# Patient Record
Sex: FEMALE | Race: WHITE | Employment: OTHER | URBAN - METROPOLITAN AREA
[De-identification: names, ages, dates, MRNs, and addresses within clinical notes are randomized per-mention and may not be internally consistent; named-entity substitution may affect disease eponyms.]

---

## 2022-01-13 ENCOUNTER — APPOINTMENT (OUTPATIENT)
Dept: CT IMAGING | Age: 82
DRG: 374 | End: 2022-01-13
Payer: MEDICARE

## 2022-01-13 ENCOUNTER — HOSPITAL ENCOUNTER (INPATIENT)
Age: 82
LOS: 8 days | Discharge: HOSPICE | DRG: 374 | End: 2022-01-21
Attending: EMERGENCY MEDICINE | Admitting: HOSPITALIST
Payer: MEDICARE

## 2022-01-13 ENCOUNTER — APPOINTMENT (OUTPATIENT)
Dept: GENERAL RADIOLOGY | Age: 82
DRG: 374 | End: 2022-01-13
Payer: MEDICARE

## 2022-01-13 DIAGNOSIS — I26.99 ACUTE PULMONARY EMBOLISM WITHOUT ACUTE COR PULMONALE, UNSPECIFIED PULMONARY EMBOLISM TYPE (HCC): Primary | ICD-10-CM

## 2022-01-13 DIAGNOSIS — J90 PLEURAL EFFUSION, BILATERAL: ICD-10-CM

## 2022-01-13 DIAGNOSIS — K74.60 CIRRHOSIS OF LIVER WITH ASCITES, UNSPECIFIED HEPATIC CIRRHOSIS TYPE (HCC): ICD-10-CM

## 2022-01-13 DIAGNOSIS — R18.8 CIRRHOSIS OF LIVER WITH ASCITES, UNSPECIFIED HEPATIC CIRRHOSIS TYPE (HCC): ICD-10-CM

## 2022-01-13 LAB
A/G RATIO: 1 (ref 1.1–2.2)
ALBUMIN SERPL-MCNC: 2.7 G/DL (ref 3.4–5)
ALP BLD-CCNC: 116 U/L (ref 40–129)
ALT SERPL-CCNC: 9 U/L (ref 10–40)
ANION GAP SERPL CALCULATED.3IONS-SCNC: 16 MMOL/L (ref 3–16)
AST SERPL-CCNC: 18 U/L (ref 15–37)
BASOPHILS ABSOLUTE: 0 K/UL (ref 0–0.2)
BASOPHILS RELATIVE PERCENT: 0.4 %
BILIRUB SERPL-MCNC: 0.5 MG/DL (ref 0–1)
BUN BLDV-MCNC: 17 MG/DL (ref 7–20)
CALCIUM SERPL-MCNC: 8.3 MG/DL (ref 8.3–10.6)
CHLORIDE BLD-SCNC: 98 MMOL/L (ref 99–110)
CO2: 20 MMOL/L (ref 21–32)
CREAT SERPL-MCNC: 0.7 MG/DL (ref 0.6–1.2)
D DIMER: >5250 NG/ML DDU (ref 0–229)
EOSINOPHILS ABSOLUTE: 0.1 K/UL (ref 0–0.6)
EOSINOPHILS RELATIVE PERCENT: 0.9 %
GFR AFRICAN AMERICAN: >60
GFR NON-AFRICAN AMERICAN: >60
GLUCOSE BLD-MCNC: 150 MG/DL (ref 70–99)
HCT VFR BLD CALC: 37.5 % (ref 36–48)
HEMOGLOBIN: 12.5 G/DL (ref 12–16)
LYMPHOCYTES ABSOLUTE: 0.5 K/UL (ref 1–5.1)
LYMPHOCYTES RELATIVE PERCENT: 6.4 %
MAGNESIUM: 1.9 MG/DL (ref 1.8–2.4)
MCH RBC QN AUTO: 29.8 PG (ref 26–34)
MCHC RBC AUTO-ENTMCNC: 33.4 G/DL (ref 31–36)
MCV RBC AUTO: 89.2 FL (ref 80–100)
MONOCYTES ABSOLUTE: 0.5 K/UL (ref 0–1.3)
MONOCYTES RELATIVE PERCENT: 7.2 %
NEUTROPHILS ABSOLUTE: 6.2 K/UL (ref 1.7–7.7)
NEUTROPHILS RELATIVE PERCENT: 85.1 %
PDW BLD-RTO: 15.1 % (ref 12.4–15.4)
PLATELET # BLD: 241 K/UL (ref 135–450)
PMV BLD AUTO: 7.8 FL (ref 5–10.5)
POTASSIUM REFLEX MAGNESIUM: 3.4 MMOL/L (ref 3.5–5.1)
PRO-BNP: 360 PG/ML (ref 0–449)
RBC # BLD: 4.2 M/UL (ref 4–5.2)
SODIUM BLD-SCNC: 134 MMOL/L (ref 136–145)
TOTAL PROTEIN: 5.3 G/DL (ref 6.4–8.2)
WBC # BLD: 7.3 K/UL (ref 4–11)

## 2022-01-13 PROCEDURE — 99284 EMERGENCY DEPT VISIT MOD MDM: CPT

## 2022-01-13 PROCEDURE — 83880 ASSAY OF NATRIURETIC PEPTIDE: CPT

## 2022-01-13 PROCEDURE — 2060000000 HC ICU INTERMEDIATE R&B

## 2022-01-13 PROCEDURE — 80053 COMPREHEN METABOLIC PANEL: CPT

## 2022-01-13 PROCEDURE — 83735 ASSAY OF MAGNESIUM: CPT

## 2022-01-13 PROCEDURE — 6360000004 HC RX CONTRAST MEDICATION: Performed by: EMERGENCY MEDICINE

## 2022-01-13 PROCEDURE — 6370000000 HC RX 637 (ALT 250 FOR IP): Performed by: HOSPITALIST

## 2022-01-13 PROCEDURE — 71045 X-RAY EXAM CHEST 1 VIEW: CPT

## 2022-01-13 PROCEDURE — 6370000000 HC RX 637 (ALT 250 FOR IP): Performed by: EMERGENCY MEDICINE

## 2022-01-13 PROCEDURE — 71260 CT THORAX DX C+: CPT

## 2022-01-13 PROCEDURE — 86706 HEP B SURFACE ANTIBODY: CPT

## 2022-01-13 PROCEDURE — 85025 COMPLETE CBC W/AUTO DIFF WBC: CPT

## 2022-01-13 PROCEDURE — 85379 FIBRIN DEGRADATION QUANT: CPT

## 2022-01-13 PROCEDURE — 36415 COLL VENOUS BLD VENIPUNCTURE: CPT

## 2022-01-13 RX ORDER — METFORMIN HYDROCHLORIDE 500 MG/1
TABLET, EXTENDED RELEASE ORAL
COMMUNITY
Start: 2021-11-09

## 2022-01-13 RX ORDER — POTASSIUM CHLORIDE 20 MEQ/1
40 TABLET, EXTENDED RELEASE ORAL ONCE
Status: COMPLETED | OUTPATIENT
Start: 2022-01-13 | End: 2022-01-13

## 2022-01-13 RX ORDER — DILTIAZEM HYDROCHLORIDE 120 MG/1
CAPSULE, COATED, EXTENDED RELEASE ORAL
Status: ON HOLD | COMMUNITY
Start: 2021-11-09 | End: 2022-01-21 | Stop reason: HOSPADM

## 2022-01-13 RX ORDER — IRBESARTAN AND HYDROCHLOROTHIAZIDE 300; 12.5 MG/1; MG/1
1 TABLET, FILM COATED ORAL DAILY
Status: ON HOLD | COMMUNITY
Start: 2021-11-08 | End: 2022-01-21 | Stop reason: HOSPADM

## 2022-01-13 RX ADMIN — IOPAMIDOL 75 ML: 755 INJECTION, SOLUTION INTRAVENOUS at 19:59

## 2022-01-13 RX ADMIN — APIXABAN 10 MG: 5 TABLET, FILM COATED ORAL at 20:47

## 2022-01-13 RX ADMIN — POTASSIUM CHLORIDE 40 MEQ: 20 TABLET, EXTENDED RELEASE ORAL at 22:37

## 2022-01-14 ENCOUNTER — APPOINTMENT (OUTPATIENT)
Dept: CT IMAGING | Age: 82
DRG: 374 | End: 2022-01-14
Payer: MEDICARE

## 2022-01-14 LAB
A/G RATIO: 1.2 (ref 1.1–2.2)
ALBUMIN SERPL-MCNC: 2.5 G/DL (ref 3.4–5)
ALP BLD-CCNC: 101 U/L (ref 40–129)
ALT SERPL-CCNC: 8 U/L (ref 10–40)
ANION GAP SERPL CALCULATED.3IONS-SCNC: 13 MMOL/L (ref 3–16)
AST SERPL-CCNC: 15 U/L (ref 15–37)
BASOPHILS ABSOLUTE: 0 K/UL (ref 0–0.2)
BASOPHILS RELATIVE PERCENT: 0.6 %
BILIRUB SERPL-MCNC: 0.4 MG/DL (ref 0–1)
BUN BLDV-MCNC: 17 MG/DL (ref 7–20)
CALCIUM SERPL-MCNC: 8 MG/DL (ref 8.3–10.6)
CHLORIDE BLD-SCNC: 98 MMOL/L (ref 99–110)
CHOLESTEROL, TOTAL: 146 MG/DL (ref 0–199)
CO2: 23 MMOL/L (ref 21–32)
CREAT SERPL-MCNC: 0.8 MG/DL (ref 0.6–1.2)
EOSINOPHILS ABSOLUTE: 0.1 K/UL (ref 0–0.6)
EOSINOPHILS RELATIVE PERCENT: 1.8 %
FERRITIN: 601.5 NG/ML (ref 15–150)
GFR AFRICAN AMERICAN: >60
GFR NON-AFRICAN AMERICAN: >60
GLUCOSE BLD-MCNC: 146 MG/DL (ref 70–99)
GLUCOSE BLD-MCNC: 174 MG/DL (ref 70–99)
GLUCOSE BLD-MCNC: 177 MG/DL (ref 70–99)
GLUCOSE BLD-MCNC: 181 MG/DL (ref 70–99)
HAV IGM SER IA-ACNC: NORMAL
HBV SURFACE AB TITR SER: <3.5 MIU/ML
HCT VFR BLD CALC: 33 % (ref 36–48)
HCT VFR BLD CALC: 33.1 % (ref 36–48)
HCT VFR BLD CALC: 35.7 % (ref 36–48)
HDLC SERPL-MCNC: 34 MG/DL (ref 40–60)
HEMOGLOBIN: 10.9 G/DL (ref 12–16)
HEMOGLOBIN: 11.2 G/DL (ref 12–16)
HEMOGLOBIN: 11.9 G/DL (ref 12–16)
HEPATITIS B CORE IGM ANTIBODY: NORMAL
HEPATITIS B SURFACE ANTIGEN INTERPRETATION: NORMAL
HEPATITIS C ANTIBODY INTERPRETATION: NORMAL
INR BLD: 1.51 (ref 0.88–1.12)
IRON SATURATION: 15 % (ref 15–50)
IRON: 29 UG/DL (ref 37–145)
LDL CHOLESTEROL CALCULATED: 85 MG/DL
LYMPHOCYTES ABSOLUTE: 0.6 K/UL (ref 1–5.1)
LYMPHOCYTES RELATIVE PERCENT: 10.4 %
MAGNESIUM: 1.7 MG/DL (ref 1.8–2.4)
MCH RBC QN AUTO: 29.8 PG (ref 26–34)
MCHC RBC AUTO-ENTMCNC: 34 G/DL (ref 31–36)
MCV RBC AUTO: 87.8 FL (ref 80–100)
MONOCYTES ABSOLUTE: 0.6 K/UL (ref 0–1.3)
MONOCYTES RELATIVE PERCENT: 9.5 %
NEUTROPHILS ABSOLUTE: 4.5 K/UL (ref 1.7–7.7)
NEUTROPHILS RELATIVE PERCENT: 77.7 %
PDW BLD-RTO: 15.1 % (ref 12.4–15.4)
PERFORMED ON: ABNORMAL
PLATELET # BLD: 228 K/UL (ref 135–450)
PMV BLD AUTO: 7.7 FL (ref 5–10.5)
POTASSIUM REFLEX MAGNESIUM: 3.4 MMOL/L (ref 3.5–5.1)
PROTHROMBIN TIME: 17.3 SEC (ref 9.9–12.7)
RBC # BLD: 3.77 M/UL (ref 4–5.2)
SODIUM BLD-SCNC: 134 MMOL/L (ref 136–145)
TOTAL IRON BINDING CAPACITY: 196 UG/DL (ref 260–445)
TOTAL PROTEIN: 4.6 G/DL (ref 6.4–8.2)
TRIGL SERPL-MCNC: 135 MG/DL (ref 0–150)
TSH REFLEX: 2.56 UIU/ML (ref 0.27–4.2)
VITAMIN D 25-HYDROXY: 22.5 NG/ML
VLDLC SERPL CALC-MCNC: 27 MG/DL
WBC # BLD: 5.9 K/UL (ref 4–11)

## 2022-01-14 PROCEDURE — 6360000002 HC RX W HCPCS: Performed by: FAMILY MEDICINE

## 2022-01-14 PROCEDURE — 2580000003 HC RX 258: Performed by: HOSPITALIST

## 2022-01-14 PROCEDURE — 83550 IRON BINDING TEST: CPT

## 2022-01-14 PROCEDURE — 83735 ASSAY OF MAGNESIUM: CPT

## 2022-01-14 PROCEDURE — 82105 ALPHA-FETOPROTEIN SERUM: CPT

## 2022-01-14 PROCEDURE — 82390 ASSAY OF CERULOPLASMIN: CPT

## 2022-01-14 PROCEDURE — 82728 ASSAY OF FERRITIN: CPT

## 2022-01-14 PROCEDURE — 85018 HEMOGLOBIN: CPT

## 2022-01-14 PROCEDURE — 6370000000 HC RX 637 (ALT 250 FOR IP): Performed by: HOSPITALIST

## 2022-01-14 PROCEDURE — 82306 VITAMIN D 25 HYDROXY: CPT

## 2022-01-14 PROCEDURE — 6360000002 HC RX W HCPCS: Performed by: INTERNAL MEDICINE

## 2022-01-14 PROCEDURE — 6360000004 HC RX CONTRAST MEDICATION: Performed by: PHYSICIAN ASSISTANT

## 2022-01-14 PROCEDURE — 86708 HEPATITIS A ANTIBODY: CPT

## 2022-01-14 PROCEDURE — 94760 N-INVAS EAR/PLS OXIMETRY 1: CPT

## 2022-01-14 PROCEDURE — 6370000000 HC RX 637 (ALT 250 FOR IP): Performed by: INTERNAL MEDICINE

## 2022-01-14 PROCEDURE — 84443 ASSAY THYROID STIM HORMONE: CPT

## 2022-01-14 PROCEDURE — 85014 HEMATOCRIT: CPT

## 2022-01-14 PROCEDURE — 93970 EXTREMITY STUDY: CPT

## 2022-01-14 PROCEDURE — 6360000004 HC RX CONTRAST MEDICATION: Performed by: NURSE PRACTITIONER

## 2022-01-14 PROCEDURE — 85025 COMPLETE CBC W/AUTO DIFF WBC: CPT

## 2022-01-14 PROCEDURE — 80053 COMPREHEN METABOLIC PANEL: CPT

## 2022-01-14 PROCEDURE — 83516 IMMUNOASSAY NONANTIBODY: CPT

## 2022-01-14 PROCEDURE — 82104 ALPHA-1-ANTITRYPSIN PHENO: CPT

## 2022-01-14 PROCEDURE — 6360000004 HC RX CONTRAST MEDICATION

## 2022-01-14 PROCEDURE — 80074 ACUTE HEPATITIS PANEL: CPT

## 2022-01-14 PROCEDURE — 86038 ANTINUCLEAR ANTIBODIES: CPT

## 2022-01-14 PROCEDURE — 36415 COLL VENOUS BLD VENIPUNCTURE: CPT

## 2022-01-14 PROCEDURE — 83540 ASSAY OF IRON: CPT

## 2022-01-14 PROCEDURE — 82103 ALPHA-1-ANTITRYPSIN TOTAL: CPT

## 2022-01-14 PROCEDURE — 74177 CT ABD & PELVIS W/CONTRAST: CPT

## 2022-01-14 PROCEDURE — 86039 ANTINUCLEAR ANTIBODIES (ANA): CPT

## 2022-01-14 PROCEDURE — 80061 LIPID PANEL: CPT

## 2022-01-14 PROCEDURE — 2060000000 HC ICU INTERMEDIATE R&B

## 2022-01-14 PROCEDURE — 85610 PROTHROMBIN TIME: CPT

## 2022-01-14 RX ORDER — SODIUM CHLORIDE 0.9 % (FLUSH) 0.9 %
5-40 SYRINGE (ML) INJECTION EVERY 12 HOURS SCHEDULED
Status: DISCONTINUED | OUTPATIENT
Start: 2022-01-14 | End: 2022-01-21 | Stop reason: HOSPADM

## 2022-01-14 RX ORDER — SODIUM CHLORIDE 0.9 % (FLUSH) 0.9 %
5-40 SYRINGE (ML) INJECTION PRN
Status: DISCONTINUED | OUTPATIENT
Start: 2022-01-14 | End: 2022-01-21 | Stop reason: HOSPADM

## 2022-01-14 RX ORDER — ATORVASTATIN CALCIUM 40 MG/1
40 TABLET, FILM COATED ORAL NIGHTLY
Status: DISCONTINUED | OUTPATIENT
Start: 2022-01-14 | End: 2022-01-14

## 2022-01-14 RX ORDER — MAGNESIUM SULFATE 1 G/100ML
1000 INJECTION INTRAVENOUS ONCE
Status: COMPLETED | OUTPATIENT
Start: 2022-01-14 | End: 2022-01-14

## 2022-01-14 RX ORDER — SODIUM CHLORIDE 9 MG/ML
25 INJECTION, SOLUTION INTRAVENOUS PRN
Status: DISCONTINUED | OUTPATIENT
Start: 2022-01-14 | End: 2022-01-14 | Stop reason: SDUPTHER

## 2022-01-14 RX ORDER — ACETAMINOPHEN 650 MG/1
650 SUPPOSITORY RECTAL EVERY 6 HOURS PRN
Status: DISCONTINUED | OUTPATIENT
Start: 2022-01-14 | End: 2022-01-21 | Stop reason: HOSPADM

## 2022-01-14 RX ORDER — ONDANSETRON 4 MG/1
4 TABLET, ORALLY DISINTEGRATING ORAL EVERY 8 HOURS PRN
Status: DISCONTINUED | OUTPATIENT
Start: 2022-01-14 | End: 2022-01-21 | Stop reason: HOSPADM

## 2022-01-14 RX ORDER — ONDANSETRON 2 MG/ML
4 INJECTION INTRAMUSCULAR; INTRAVENOUS EVERY 6 HOURS PRN
Status: DISCONTINUED | OUTPATIENT
Start: 2022-01-14 | End: 2022-01-14 | Stop reason: SDUPTHER

## 2022-01-14 RX ORDER — POTASSIUM BICARBONATE 25 MEQ/1
50 TABLET, EFFERVESCENT ORAL ONCE
Status: COMPLETED | OUTPATIENT
Start: 2022-01-14 | End: 2022-01-14

## 2022-01-14 RX ORDER — SODIUM CHLORIDE 0.9 % (FLUSH) 0.9 %
5-40 SYRINGE (ML) INJECTION PRN
Status: DISCONTINUED | OUTPATIENT
Start: 2022-01-14 | End: 2022-01-14 | Stop reason: SDUPTHER

## 2022-01-14 RX ORDER — HYDROCHLOROTHIAZIDE 25 MG/1
12.5 TABLET ORAL DAILY
Status: DISCONTINUED | OUTPATIENT
Start: 2022-01-14 | End: 2022-01-19

## 2022-01-14 RX ORDER — ONDANSETRON 4 MG/1
4 TABLET, ORALLY DISINTEGRATING ORAL EVERY 8 HOURS PRN
Status: DISCONTINUED | OUTPATIENT
Start: 2022-01-14 | End: 2022-01-14 | Stop reason: SDUPTHER

## 2022-01-14 RX ORDER — ONDANSETRON 2 MG/ML
4 INJECTION INTRAMUSCULAR; INTRAVENOUS EVERY 6 HOURS PRN
Status: DISCONTINUED | OUTPATIENT
Start: 2022-01-14 | End: 2022-01-21 | Stop reason: HOSPADM

## 2022-01-14 RX ORDER — LOSARTAN POTASSIUM 100 MG/1
100 TABLET ORAL DAILY
Status: DISCONTINUED | OUTPATIENT
Start: 2022-01-14 | End: 2022-01-21 | Stop reason: HOSPADM

## 2022-01-14 RX ORDER — ACETAMINOPHEN 325 MG/1
650 TABLET ORAL EVERY 6 HOURS PRN
Status: DISCONTINUED | OUTPATIENT
Start: 2022-01-14 | End: 2022-01-21 | Stop reason: HOSPADM

## 2022-01-14 RX ORDER — IRBESARTAN AND HYDROCHLOROTHIAZIDE 300; 12.5 MG/1; MG/1
1 TABLET, FILM COATED ORAL DAILY
Status: DISCONTINUED | OUTPATIENT
Start: 2022-01-14 | End: 2022-01-14 | Stop reason: CLARIF

## 2022-01-14 RX ORDER — MAGNESIUM HYDROXIDE/ALUMINUM HYDROXICE/SIMETHICONE 120; 1200; 1200 MG/30ML; MG/30ML; MG/30ML
30 SUSPENSION ORAL EVERY 6 HOURS PRN
Status: DISCONTINUED | OUTPATIENT
Start: 2022-01-14 | End: 2022-01-21 | Stop reason: HOSPADM

## 2022-01-14 RX ORDER — DILTIAZEM HYDROCHLORIDE 60 MG/1
120 CAPSULE, EXTENDED RELEASE ORAL DAILY
Status: DISCONTINUED | OUTPATIENT
Start: 2022-01-14 | End: 2022-01-21 | Stop reason: HOSPADM

## 2022-01-14 RX ORDER — PANTOPRAZOLE SODIUM 40 MG/1
40 TABLET, DELAYED RELEASE ORAL
Status: DISCONTINUED | OUTPATIENT
Start: 2022-01-15 | End: 2022-01-21 | Stop reason: HOSPADM

## 2022-01-14 RX ORDER — SODIUM CHLORIDE 0.9 % (FLUSH) 0.9 %
5-40 SYRINGE (ML) INJECTION EVERY 12 HOURS SCHEDULED
Status: DISCONTINUED | OUTPATIENT
Start: 2022-01-14 | End: 2022-01-14 | Stop reason: SDUPTHER

## 2022-01-14 RX ORDER — SODIUM CHLORIDE 9 MG/ML
25 INJECTION, SOLUTION INTRAVENOUS PRN
Status: DISCONTINUED | OUTPATIENT
Start: 2022-01-14 | End: 2022-01-21 | Stop reason: HOSPADM

## 2022-01-14 RX ORDER — ATORVASTATIN CALCIUM 40 MG/1
40 TABLET, FILM COATED ORAL DAILY
Status: DISCONTINUED | OUTPATIENT
Start: 2022-01-14 | End: 2022-01-21 | Stop reason: HOSPADM

## 2022-01-14 RX ORDER — POLYETHYLENE GLYCOL 3350 17 G/17G
17 POWDER, FOR SOLUTION ORAL DAILY PRN
Status: DISCONTINUED | OUTPATIENT
Start: 2022-01-14 | End: 2022-01-21 | Stop reason: HOSPADM

## 2022-01-14 RX ADMIN — Medication 10 ML: at 10:16

## 2022-01-14 RX ADMIN — IOPAMIDOL 75 ML: 755 INJECTION, SOLUTION INTRAVENOUS at 13:22

## 2022-01-14 RX ADMIN — DILTIAZEM HYDROCHLORIDE 120 MG: 60 CAPSULE, EXTENDED RELEASE ORAL at 10:28

## 2022-01-14 RX ADMIN — IOHEXOL 50 ML: 240 INJECTION, SOLUTION INTRATHECAL; INTRAVASCULAR; INTRAVENOUS; ORAL at 10:30

## 2022-01-14 RX ADMIN — APIXABAN 10 MG: 5 TABLET, FILM COATED ORAL at 10:08

## 2022-01-14 RX ADMIN — IOHEXOL 50 ML: 240 INJECTION, SOLUTION INTRATHECAL; INTRAVASCULAR; INTRAVENOUS; ORAL at 10:31

## 2022-01-14 RX ADMIN — MAGNESIUM SULFATE HEPTAHYDRATE 1000 MG: 1 INJECTION, SOLUTION INTRAVENOUS at 10:47

## 2022-01-14 RX ADMIN — LOSARTAN POTASSIUM 100 MG: 100 TABLET, FILM COATED ORAL at 10:08

## 2022-01-14 RX ADMIN — HYDROCHLOROTHIAZIDE 12.5 MG: 25 TABLET ORAL at 10:07

## 2022-01-14 RX ADMIN — ENOXAPARIN SODIUM 80 MG: 100 INJECTION SUBCUTANEOUS at 20:00

## 2022-01-14 RX ADMIN — Medication 10 ML: at 20:00

## 2022-01-14 RX ADMIN — ATORVASTATIN CALCIUM 40 MG: 40 TABLET, FILM COATED ORAL at 10:28

## 2022-01-14 RX ADMIN — POTASSIUM BICARBONATE 50 MEQ: 978 TABLET, EFFERVESCENT ORAL at 10:27

## 2022-01-14 ASSESSMENT — PAIN SCALES - GENERAL
PAINLEVEL_OUTOF10: 0

## 2022-01-14 NOTE — PROGRESS NOTES
I spoke with Carver Homans, RN, patient had a sudden onset of nausea after her scan and then had an episode of emesis which appears orange/red in color, this will be sent to check for microscopic blood. I will check a stat H&H and check H&H more frequently every 6 hours, will change Eliquis to Lovenox in case of any need for any procedures. RN did reach out to GI physician who recommended to continue present management unless patient develops overt bleeding, message was sent to attending physician to evaluate tomorrow morning.

## 2022-01-14 NOTE — ED PROVIDER NOTES
[address] PROVIDER NOTE    Patient Identification  Pt Name: Seda Tomas  MRN: [id number]  Christigfhellen 1940  Date of evaluation: 1/13/2022  Provider: Katy Esteves MD  PCP: Philomena Amor MD    Chief Complaint  Leg Swelling (Pt reports \"fluid retention\" with leg swelling ) and Fatigue      HPI  (History provided by patient)  This is a 80 y.o. female who was brought in by self for worsening lower extremity edema over the last 2 weeks. She is also developed increasing shortness of breath, particularly on exertion. She feels very tired and has little energy. She has not had fever or chills. She denies chest pain, hemoptysis, productive cough, and other symptoms. Previous history significant for multiple cardiac risk factors as well as DVT and PE. Symptoms have become so significant that she came to the emergency department for further evaluation. ROS  10 systems reviewed, pertinent positives/negatives per HPI otherwise noted to be negative. I have reviewed the following nursing documentation:  Allergies: Amoxicillin-pot clavulanate    Past medical history:   Past Medical History:   Diagnosis Date    Cancer ([address]     skin     Diabetes mellitus ([address]     DVT, lower extremity ([address]     Heart murmur     Hyperlipidemia     Hypertension     Pulmonary embolism ([address]      Past surgical history:   Past Surgical History:   Procedure Laterality Date    APPENDECTOMY      COLONOSCOPY      CYSTOSCOPY  11/21/13    HYSTERECTOMY      OTHER SURGICAL HISTORY  11/21/13    Transvaginal taping and sling    PARTIAL NEPHRECTOMY Right 07/12/2016    robotic - [facility] medications:   Previous Medications    ASPIRIN 81 MG TABLET    Take 81 mg by mouth daily    ATORVASTATIN (LIPITOR) 40 MG TABLET    Take 40 mg by mouth daily    CALCIUM CARBONATE (OSCAL) 500 MG TABS TABLET    Take 500 mg by mouth daily.     DILTIAZEM (CARDIZEM CD) 120 MG EXTENDED RELEASE CAPSULE        DILTIAZEM (CARDIZEM SR) 120 MG SR CAPSULE    Take 120 mg by mouth daily    IRBESARTAN-HYDROCHLOROTHIAZIDE (AVALIDE) 300-12.5 MG PER TABLET    Take 1 tablet by mouth daily    METFORMIN (GLUCOPHAGE) 500 MG TABLET    Take 500 mg by mouth 2 times daily (with meals)    METFORMIN (GLUCOPHAGE-XR) 500 MG EXTENDED RELEASE TABLET        POTASSIUM GLUCONATE 595 MG CAPS    Take  by mouth daily. SENNOSIDES-DOCUSATE SODIUM (SENOKOT-S) 8.6-50 MG TABLET    Take 1 tablet by mouth 2 times daily    VALSARTAN-HYDROCHLOROTHIAZIDE (DIOVAN HCT) 160-12.5 MG PER TABLET    Take 1 tablet by mouth daily. Social history:  reports that she has never smoked. She has never used smokeless tobacco. She reports that she does not drink alcohol and does not use drugs. Family history:    Family History   Problem Relation Age of Onset    Cancer Mother         stomach    Anesth Problems Neg Hx     Malig Hyperten Neg Hx     Hypotension Neg Hx     Malig Hypertherm Neg Hx     Pseudochol. Deficiency Neg Hx        Exam  ED Triage Vitals [01/13/22 1643]   BP Temp Temp src Pulse Resp SpO2 Height Weight   (!) 158/84 97.5 °F (36.4 °C) -- 83 18 97 % -- 198 lb (89.8 kg)     Nursing note and vitals reviewed. Constitutional: Ill-appearing, but nontoxic. HENT:      Head: Normocephalic and atraumatic. Ears: External ears normal.      Nose: Nose normal.     Mouth: Membrane mucosa moist and pink. Eyes: Anicteric sclera. No discharge. Neck: Supple. Trachea midline. Cardiovascular: RRR; no murmurs, rubs, or gallops. Pulmonary/Chest: Effort normal. No respiratory distress. Diminished breath sounds in the bilateral bases. Otherwise clear to auscultation. No wheezes no rales. Abdominal: Soft. No distension. Nontender to palpation  Musculoskeletal: Moves all extremities. No gross deformity. Pitting edema to bilateral lower extremities. Neurological: Alert and oriented. Face symmetric.  Speech is clear.  Skin: Warm and dry. No rash. Psychiatric: Normal mood and affect. Behavior is normal.    Radiology  CT CHEST PULMONARY EMBOLISM W CONTRAST   Preliminary Result   Acute pulmonary emboli. No CT evidence of right heart strain. Moderate left and small right pleural effusions. Large amount of ascites. Probable cirrhosis. XR CHEST PORTABLE   Final Result   Small right pleural effusion with adjacent atelectasis. Mild pulmonary edema.              Labs  Results for orders placed or performed during the hospital encounter of 01/13/22   CBC Auto Differential   Result Value Ref Range    WBC 7.3 4.0 - 11.0 K/uL    RBC 4.20 4.00 - 5.20 M/uL    Hemoglobin 12.5 12.0 - 16.0 g/dL    Hematocrit 37.5 36.0 - 48.0 %    MCV 89.2 80.0 - 100.0 fL    MCH 29.8 26.0 - 34.0 pg    MCHC 33.4 31.0 - 36.0 g/dL    RDW 15.1 12.4 - 15.4 %    Platelets 963 403 - 855 K/uL    MPV 7.8 5.0 - 10.5 fL    Neutrophils % 85.1 %    Lymphocytes % 6.4 %    Monocytes % 7.2 %    Eosinophils % 0.9 %    Basophils % 0.4 %    Neutrophils Absolute 6.2 1.7 - 7.7 K/uL    Lymphocytes Absolute 0.5 (L) 1.0 - 5.1 K/uL    Monocytes Absolute 0.5 0.0 - 1.3 K/uL    Eosinophils Absolute 0.1 0.0 - 0.6 K/uL    Basophils Absolute 0.0 0.0 - 0.2 K/uL   Comprehensive Metabolic Panel w/ Reflex to MG   Result Value Ref Range    Sodium 134 (L) 136 - 145 mmol/L    Potassium reflex Magnesium 3.4 (L) 3.5 - 5.1 mmol/L    Chloride 98 (L) 99 - 110 mmol/L    CO2 20 (L) 21 - 32 mmol/L    Anion Gap 16 3 - 16    Glucose 150 (H) 70 - 99 mg/dL    BUN 17 7 - 20 mg/dL    CREATININE 0.7 0.6 - 1.2 mg/dL    GFR Non-African American >60 >60    GFR African American >60 >60    Calcium 8.3 8.3 - 10.6 mg/dL    Total Protein 5.3 (L) 6.4 - 8.2 g/dL    Albumin 2.7 (L) 3.4 - 5.0 g/dL    Albumin/Globulin Ratio 1.0 (L) 1.1 - 2.2    Total Bilirubin 0.5 0.0 - 1.0 mg/dL    Alkaline Phosphatase 116 40 - 129 U/L    ALT 9 (L) 10 - 40 U/L    AST 18 15 - 37 U/L   Brain Natriuretic Peptide Result Value Ref Range    Pro- 0 - 449 pg/mL   D-dimer, quantitative   Result Value Ref Range    D-Dimer, Quant >5250 (H) 0 - 229 ng/mL DDU   Magnesium   Result Value Ref Range    Magnesium 1.90 1.80 - 2.40 mg/dL       MDM and ED Course  Patient presented with severe lower extremity edema worsening over the last few weeks. She was also complaining of worsening dyspnea on exertion. She had previous history of PE/DVT, but is not currently on anticoagulation. D-dimer was markedly elevated. CT chest shows acute emboli in the right lung. When she first arrived and I suspected she at least at DVTs, I had initiated anticoagulation with Eliquis. She also has pleural effusions, ascites, and cirrhosis with no previous known history of liver disease. She denies previous history of hepatitis and denies previous or current alcoholism. Son at bedside was equally surprised by the liver findings. I consulted Dr. Collette Dais through [facility] for admission. He reviewed the patient's history, physical exam, labs, imaging studies, and emergency department course and has decided to admit Cori Elizabeth for further evaluation and treatment. As I have deemed necessary from their history, physical, and studies, I have considered and evaluated Cori Elizabeth for the following diagnoses:  ACUTE CORONARY SYNDROME, SEVERE CHRONIC OBSTRUCTIVE PULMONARY DISEASE, RESPIRATORY FAILURE/ARREST, ACUTE CONGESTIVE HEART FAILURE, CARDIAC TAMPONADE, PNEUMONIA, PNEUMOTHORAX, PERICARDITIS, PULMONARY EMBOLISM, AORTIC DISSECTION, ARDS    The total Critical Care time is 30 minutes which excludes separately billable procedures. Final Impression  1. Acute pulmonary embolism without acute cor pulmonale, unspecified pulmonary embolism type ([address]    2. Cirrhosis of liver with ascites, unspecified hepatic cirrhosis type ([address]    3. Pleural effusion, bilateral        Blood pressure (!) 152/83, pulse 79, temperature 97.5 °F (36.4 °C), resp.  rate 19, weight 198 lb (89.8 kg), SpO2 98 %. Disposition:  Admit    This chart was generated using the [address] [place] [address] dictation system. I created this record but it may contain dictation errors given the limitations of this technology.        Jed Connor MD  01/24/22 8954

## 2022-01-14 NOTE — H&P
_    [facility] HOSPITALIST HISTORY AND PHYSICAL/CONSULT    1/13/2022 11:17 PM    Patient Information:  Rashad Wharton is a 80 y.o. female [id number]    PCP:  Britni Donnelly MD (Tel: [phone] )    Date of Service:   Pt seen/examined on 1/13/22   Admitted to Inpatient with expected LOS greater than two midnights due to medical therapy. Chief complaint:    Chief Complaint   Patient presents with    Leg Swelling     Pt reports \"fluid retention\" with leg swelling     Fatigue       History of Present Illness:  Tab Hunt is a 80 y.o. female who presented with   · Comes from Home   · Reports LE edema B/L   · Reports inc Abd distension   · Reports SOB   · All above X few weeks now   · H/O DVT Noted in past and she had been on xarelto in past for same   · Denies any chest pain   ·        History and pertinent information obtained from   · ED provider   · Prior Chart    · Patient         Notable/Significant ED Findings/VItals :   · HTN   · Is on RA   · VSS        While in ED  · Patient care Given. · Appropriate Monitoring done. · Pertinent Labs done. See [facility] for details   · Pertinent Acute issues identified and managed . See Epic for details  · Pertinent consults called and case d/w them by ED Provider . See Epic for details. · Imaging  CXR ,  CT,  done in ED . While in ED, Indicated/Pertinent Medications/Care given as below      · ED Chart reviewed. · Medications reviewed w/c were give in ED . See [facility] for details on medications and orders  · Eliquis PO X 1   · PO K X 1   ·       · Imaging/Labs/Work up done in ED reviewed and their interpretation/active and acute issues, as mentioned below in Assessment and Plan. Currently, on my evaluation, patient's condition as below :   · Since arrival, post above Rx, patient is AO X 3   · No signs of s/o acute Life Threatening distress or acute hemodynamic instability, noted @ time of my evaluation of patient.        10 complete review of symptoms performed. Pertinent positives and negatives listed above in HPI. REVIEW OF SYSTEMS:      Pertinent positives and negatives are noted in the HPI . All other systems were reviewed and are negative. Past Medical History:         has a past medical history of Cancer ([facility] Diabetes mellitus ([facility] DVT, lower extremity ([facility] Heart murmur, Hyperlipidemia, Hypertension, and Pulmonary embolism ([facility] Past Surgical History:         has a past surgical history that includes Skin cancer excision; Hysterectomy; Colonoscopy; other surgical history (11/21/13); Cystocopy (11/21/13); Appendectomy; and partial nephrectomy (Right, 07/12/2016). Medications:      Current Outpatient Medications on File Prior to Encounter   Medication Sig Dispense Refill    irbesartan-hydroCHLOROthiazide (AVALIDE) 300-12.5 MG per tablet Take 1 tablet by mouth daily      diltiazem (CARDIZEM SR) 120 MG SR capsule Take 120 mg by mouth daily      metFORMIN (GLUCOPHAGE) 500 MG tablet Take 500 mg by mouth 2 times daily (with meals)      atorvastatin (LIPITOR) 40 MG tablet Take 40 mg by mouth daily      dilTIAZem (CARDIZEM CD) 120 MG extended release capsule       metFORMIN (GLUCOPHAGE-XR) 500 MG extended release tablet       rivaroxaban (XARELTO) 15 MG TABS tablet Take 1 tablet by mouth 2 times daily (with meals) 42 tablet 0    sennosides-docusate sodium (SENOKOT-S) 8.6-50 MG tablet Take 1 tablet by mouth 2 times daily 50 tablet 0    aspirin 81 MG tablet Take 81 mg by mouth daily      Potassium Gluconate 595 MG CAPS Take  by mouth daily.  calcium carbonate (OSCAL) 500 MG TABS tablet Take 500 mg by mouth daily.  valsartan-hydrochlorothiazide (DIOVAN HCT) 160-12.5 MG per tablet Take 1 tablet by mouth daily. Allergies: Allergies   Allergen Reactions    Amoxicillin-Pot Clavulanate Hives          Social History:   reports that she has never smoked.  She has never used smokeless tobacco. She reports that she does not drink alcohol and does not use drugs. Family History:            Problem Relation Age of Onset    Cancer Mother         stomach    Anesth Problems Neg Hx     Malig Hyperten Neg Hx     Hypotension Neg Hx     Malig Hypertherm Neg Hx     Pseudochol. Deficiency Neg Hx            Physical Exam:  BP (!) 149/77   Pulse 68   Temp 97.5 °F (36.4 °C)   Resp 20   Wt 198 lb (89.8 kg)   SpO2 98%   BMI 35.07 kg/m²     General appearance:  Ill appearing . Eyes:  Sclera clear, pupils equal  ENT:  Dry   mucus membranes, Trachea midline. Cardiovascular: Regular rhythm, normal S1, S2.  LE edema in lower extremities   Respiratory:  Noted Dec AE B/ L . Gastrointestinal:  Abdomen soft,  non-tender,  not distended,  normal bowel sounds   Musculoskeletal:  No cyanosis in digits, neck supple  Neurology:  Cranial nerves grossly intact. Alert and oriented in time, place and person. No speech or motor deficits   Psychiatry:  Appropriate affect. Not agitated  Skin:  Warm, dry, normal turgor, no rash       Labs:     Recent Labs     01/13/22  1718   WBC 7.3   HGB 12.5   HCT 37.5        Recent Labs     01/13/22  1718   *   K 3.4*   CL 98*   CO2 20*   BUN 17   CREATININE 0.7   CALCIUM 8.3     Recent Labs     01/13/22  1718   AST 18   ALT 9*   BILITOT 0.5   ALKPHOS 116     No results for input(s): INR in the last 72 hours. No results for input(s): Chyrel Lions in the last 72 hours. Urinalysis:    No results found for: [place], [place], [facility], [address], [address] [facility]      Radiology:     CXR:   I have reviewed the CXR  Mild Pulm edema       IMAGING :     CT CHEST PULMONARY EMBOLISM W CONTRAST   Preliminary Result   Acute pulmonary emboli. No CT evidence of right heart strain. Moderate left and small right pleural effusions. Large amount of ascites. Probable cirrhosis.          XR CHEST PORTABLE   Final Result   Small right pleural effusion with adjacent atelectasis. Mild pulmonary edema. PROBLEM LIST [ ACTIVE + CHRONIC ]     Active Hospital Problems    Diagnosis Date Noted    Ascites [R18.8] 01/13/2022    Pulmonary hypertension (HCC) [I27.20] 07/16/2016    Acute hypoxemic respiratory failure (HCC) [J96.01]     DVT (deep vein thrombosis)  [O22.30] 07/15/2016    Hypoxia [R09.02] 07/15/2016           ACUTE & CHRONIC MEDICAL ISSUES, POA/PRESENT ON ADMISSION      · Acute PE , POA   · Mild PULM edema POA   · LE edema B/L POA   · Ascites POA   · H/o DVT in past . Provoked 2/2 Sx per Hx         PERTINENT PLAN/ORDERS FOR ACTIVE MEDICAL ISSUES     · Medication received in ED and workup in ED so far Noted and reviewed as above. · Home medications for chronic medical problems Reviewed and Held/Resumed/Changes made, as clinically warranted/Indicated. · Started PO Eliquis PO BID in ED . Will c/w same for now   · GI c.s in AM for ascites and Cirrhosis W/u   · ? Needs paracentesis planned and evaluated further . Deferred to GI Recs   · Labs for Cirrhosis w/u to be sent per orders . · LE US VD planned to r/o DVT   · ? Consider HEM c/s in AM ?? . Recurrent DVT/PE . Also her son has DVT/PEs and he is only 45s . ? Needs r/o Familial d/o for Clotting ? ?            · Please See EPIC Order for detailed plans of care and further Details. · DVT Prophylaxis . Eliquis PO   ; + SCDs   · Nutrition/Diet. No diet orders on file  · Code Status . Prior  · PT/OT and ambulatory Eval Status. Ambulate with Assist    · Probable LOS & future Disposition planned post discharge . Home in 1-2 days       CONSULTS ORDERED @ ADMISSION   IP CONSULT TO GI      Medical Decision Making : HIGH     Total patient  Care [ Direct and Indirect ] time spent in evaluating the patient an discussing plan with appropriate staff/patient/family members is 54 min       Collin Peralta MD    [facility].    1/13/2022 11:17 PM

## 2022-01-14 NOTE — PROGRESS NOTES
Physical/Occupational Therapy  Yong S Mariola    Orders recieved for PT/OT evaluation. Chart reviewed. Noted to have developed an acute PE while on anticoagulation. Dicussed with nursing. Approval obtained for therapy in pm as patient currently prepping for testing. Will hold therapy at this time and will follow up with pt in pm as schedule permits.  Thanks, Sakshi Leonard, PT, DPT [id number], Yen Logan, MOT OTR/L [id number]

## 2022-01-14 NOTE — CONSULTS
Gastroenterology Consult Note        Patient: Christen Polo  : 1940  Acct#:      Date:  2022    Subjective:       History of Present Illness  Patient is a 80 y.o.  female admitted with Ascites [R18.8]  Pleural effusion, bilateral [J90]  Cirrhosis of liver with ascites, unspecified hepatic cirrhosis type ([address] [K74.60, R18.8]  Acute pulmonary embolism without acute cor pulmonale, unspecified pulmonary embolism type ([address] [I26.99] who is seen in consult for ascites. Patient with history of diabetes hypertension hyperlipidemia. Prior blood clot in the past but no longer on anticoagulation. History of renal cell cancer status post resection followed by Dr. Kelley Webster. Patient came to the ER for 3-week history of swelling in the BLE. Also had some shortness of breath more recently. She was diagnosed with acute PE.  CT concerning for cirrhotic liver and ascites. No prior diagnosis of liver disease. No history of alcohol abuse. Patient does report some abdominal bloating but no abdominal pain. Has had some postprandial nausea and vomiting intermittently over the past month. Patient states if she eats a few bites too many then she will vomit. No hematemesis, melena, hematochezia. She reports 35 pound intentional weight loss in the past.  Per report, her son and grandchild have history of a clotting disorder.       Past Medical History:   Diagnosis Date    Cancer ([address]     skin     Diabetes mellitus ([address]     DVT, lower extremity ([address]     Heart murmur     Hyperlipidemia     Hypertension     Pulmonary embolism ([address]       Past Surgical History:   Procedure Laterality Date    APPENDECTOMY      COLONOSCOPY      CYSTOSCOPY  13    HYSTERECTOMY      OTHER SURGICAL HISTORY  13    Transvaginal taping and sling    PARTIAL NEPHRECTOMY Right 2016    robotic - Dr. Venus Calixto        Past Endoscopic History: Colonoscopy maybe 5 years ago per patient. Admission Meds  No current facility-administered medications on file prior to encounter. Current Outpatient Medications on File Prior to Encounter   Medication Sig Dispense Refill    irbesartan-hydroCHLOROthiazide (AVALIDE) 300-12.5 MG per tablet Take 1 tablet by mouth daily      diltiazem (CARDIZEM SR) 120 MG SR capsule Take 120 mg by mouth daily      metFORMIN (GLUCOPHAGE) 500 MG tablet Take 500 mg by mouth 2 times daily (with meals)      atorvastatin (LIPITOR) 40 MG tablet Take 40 mg by mouth daily      aspirin 81 MG tablet Take 81 mg by mouth daily      dilTIAZem (CARDIZEM CD) 120 MG extended release capsule       metFORMIN (GLUCOPHAGE-XR) 500 MG extended release tablet       rivaroxaban (XARELTO) 15 MG TABS tablet Take 1 tablet by mouth 2 times daily (with meals) 42 tablet 0    sennosides-docusate sodium (SENOKOT-S) 8.6-50 MG tablet Take 1 tablet by mouth 2 times daily 50 tablet 0    Potassium Gluconate 595 MG CAPS Take  by mouth daily.  calcium carbonate (OSCAL) 500 MG TABS tablet Take 500 mg by mouth daily.  valsartan-hydrochlorothiazide (DIOVAN HCT) 160-12.5 MG per tablet Take 1 tablet by mouth daily. Patient denies NSAID use other than baby aspirin daily. Allergies  Allergies   Allergen Reactions    Amoxicillin-Pot Clavulanate Hives      Social   Social History     Tobacco Use    Smoking status: Never Smoker    Smokeless tobacco: Never Used   Substance Use Topics    Alcohol use: No        Family History   Problem Relation Age of Onset    Cancer Mother         stomach    Anesth Problems Neg Hx     Malig Hyperten Neg Hx     Hypotension Neg Hx     Malig Hypertherm Neg Hx     Pseudochol.  Deficiency Neg Hx        Review of Systems  Constitutional: negative for fevers, chills, sweats    Ears, nose, mouth, throat, and face: negative for nasal congestion and sore throat   Respiratory: negative for cough + shortness of breath   Cardiovascular: negative for chest pain and dyspnea   Gastrointestinal: see hpi   Genitourinary:negative for dysuria and frequency   Integument/breast: negative for pruritus and rash   Hematologic/lymphatic: negative for bleeding and easy bruising + swelling in BLE  Musculoskeletal:negative for arthralgias and myalgias   Neurological: negative for dizziness and weakness         Physical Exam  Blood pressure 111/64, pulse 72, temperature 98.5 °F (36.9 °C), temperature source Oral, resp. rate 20, height 5' 2\" (1.575 m), weight 176 lb (79.8 kg), SpO2 94 %. General appearance: alert, cooperative, no distress, appears stated age  Eyes: Anicteric  Head: Normocephalic, without obvious abnormality  Lungs: clear to auscultation bilaterally, Normal Effort  Heart: regular rate and rhythm, normal S1 and S2, no murmurs or rubs  Abdomen: soft, non-tender. Bowel sounds normal. No masses,  no organomegaly. Extremities: atraumatic, no cyanosis. BLE edema  Skin: warm and dry, no jaundice  Neuro: Grossly intact, A&OX3  Musculoskeletal: 5/5  strength BUE      Data Review:    Recent Labs     01/13/22  1718 01/14/22  0450   WBC 7.3 5.9   HGB 12.5 11.2*   HCT 37.5 33.1*   MCV 89.2 87.8    228     Recent Labs     01/13/22  1718 01/14/22  0450   * 134*   K 3.4* 3.4*   CL 98* 98*   CO2 20* 23   BUN 17 17   CREATININE 0.7 0.8     Recent Labs     01/13/22  1718 01/14/22  0450   AST 18 15   ALT 9* 8*   BILITOT 0.5 0.4   ALKPHOS 116 101     No results for input(s): LIPASE, AMYLASE in the last 72 hours. No results for input(s): PROTIME, INR in the last 72 hours. No results for input(s): PTT in the last 72 hours. No results for input(s): OCCULTBLD in the last 72 hours.     Imaging Studies:                               CT chest pe protocol with contrast 1/13/22  Impression   Acute pulmonary emboli.       No CT evidence of right heart strain.       Moderate left and small right pleural effusions.       Large amount of ascites.     Probable cirrhosis.                        Assessment:     Active Problems:    Hypoxia    DVT (deep vein thrombosis)     Pulmonary hypertension (HCC)    Acute hypoxemic respiratory failure (HCC)    Ascites  Resolved Problems:    * No resolved hospital problems. *    Cirrhosis -new diagnosis per CT as above. Liver enzymes are normal.  Normal platelets. INR pending. No history of alcohol abuse. She has longstanding diabetes so could have Lemon cirrhosis. No GI bleeding. No encephalopathy. Ascites -noted on CT. Abdomen is soft. PE -hematology following. On Eliquis. Nausea and vomiting -intermittent over the past month. Takes a baby aspirin but no other NSAIDs.       This is a very pleasant 82-year-old female who was seen at the bedside today with her certified physicians assistant  This patient came in because she has been having problems with a nausea vomiting and shortness of breath  She was found to have a CT scan showing pulmonary embolism  When she had her CT scan done she was also found to have a significant amount of ascites and possible nodular liver  So we were asked to see and evaluate her for this medical problem    In seeing her today she also had 2 people in her room at her bedside and I did answer questions  She does have significant amount of lower extremity swelling and by physical exam her abdomen is soft she does not have an overt amount of ascites on physical    At this time the question is is does this patient have cirrhosis  That is a hard question to answer there is the possibility that the patient states she has never had a problem with her liver so she may have had  A nonalcoholic fatty liver that now has transformed into a cirrhotic liver    Looking at her abdominal pelvic CT she does have a fair amount of ascites  It would be nice if we could arrange for the patient to have a paracentesis  Would then allow us to figure out what her serum to ascites albumin gradient is  And this would then tell us if this is an ascites from portal hypertension/cirrhosis or if this is an ascites from another issue    With her being on anticoagulation I do not know if our radiology division would do a paracentesis  That we will have to investigate    thank you for this kind referral    Kaushik Dagmar   Recommendations:   -PPI  -CT abdomen pelvis with IV and oral contrast to eval the liver, for the nausea and vomiting, and to rule out any obvious malignancy as source of her PE. -Check ferritin, CLAUDIA, F-actin, AMA, ceruloplasmin, alpha-1 antitrypsin, hepatitis panel  -Check AFP   -check hepatitis a and B vaccination status  -Check INR  -At some point will need EGD for her history of nausea and vomiting as well as for variceal screening  -Hold off on paracentesis at this point as she does not have any abdominal pain and is on Eliquis.     Discussed with Dr. Aminta Gil, [address] [place] [address]

## 2022-01-14 NOTE — CONSULTS
[facility]   CONSULT NOTE    1/14/2022 9:44 AM    Patient Information: Jose Willard   Date of Admit:  1/13/2022  Primary Care Physician:  Enrique Kamara MD  Requesting Physician:  Torey Mendosa MD    Reason for consult:   Evaluation and recommendations for recurrent VTE    Chief complaint:    Chief Complaint   Patient presents with    Leg Swelling     Pt reports \"fluid retention\" with leg swelling     Fatigue       History of Present Illness:  Jose Willard is a 80 y.o. female on Torey Mendosa MD service who was admitted on 1/13/2022 for fatigue, shortness of breath and and leg swelling. D-dimer was > 5250 on admission. CT pulmonary angiogram shows acute emboli within the lobar and segmental arteries of the right lung, no evidence of right heart strain. Cirrhotic liver and large amount of ascites was also noted. She denies history of liver disease. She has a history of DVT after hip surgery in 2008. She also had PE and DVT in 2016 after partial nephrectomy for renal cell carcinoma in 2016. She was treated with Xarelto. She did not require adjuvant therapy for her cancer. She denies recent surgery, travel and injury. She denies sedentary lifestyle. She reports that her son and her granddaughter have a blood clotting disorder but does not know what mutation. Past Medical History:     has a past medical history of Cancer ([address] Diabetes mellitus ([address] DVT, lower extremity ([address] Heart murmur, Hyperlipidemia, Hypertension, and Pulmonary embolism ([address]      Past Surgical History:    Past Surgical History:   Procedure Laterality Date    APPENDECTOMY      COLONOSCOPY      CYSTOSCOPY  11/21/13    HYSTERECTOMY      OTHER SURGICAL HISTORY  11/21/13    Transvaginal taping and sling    PARTIAL NEPHRECTOMY Right 07/12/2016    robotic - Dr. Jodee Blanco          Current Medications:     dilTIAZem  120 mg Oral Daily    sodium chloride flush  5-40 mL IntraVENous 2 times per day    apixaban  10 mg Oral BID    Followed by   Charly Prado ON 1/20/2022] apixaban  5 mg Oral BID    hydroCHLOROthiazide  12.5 mg Oral Daily    And    losartan  100 mg Oral Daily    atorvastatin  40 mg Oral Daily    magnesium sulfate  1,000 mg IntraVENous Once    potassium bicarbonate  50 mEq Oral Once       Allergies: Allergies   Allergen Reactions    Amoxicillin-Pot Clavulanate Hives        Social History:    reports that she has never smoked. She has never used smokeless tobacco. She reports that she does not drink alcohol and does not use drugs. Family History:     family history includes Cancer in her mother. ROS:      Constitutional:  No weight loss, No fever, No chills, No night sweats.     Eyes:  No impairment or change in vision  ENT / Mouth:  No pain, abnormal ulceration, bleeding, nasal drip or change in voice or hearing  Cardiovascular:  No chest pain, palpitations, new edema, or calf discomfort  Respiratory:  No pain, hemoptysis, change to breathing  Gastrointestinal:  No pain, cramping, jaundice, change to eating and bowel habits  Urinary:  No pain, bleeding or change in continence  Musculoskeletal:  No redness, pain, edema or weakness  Skin:  No pruritus, rash, change to nodules or lesions  Neurologic:  No discomfort, change in mental status, speech, sensory or motor activity  Psychiatric:  No change in concentration or change to affect or mood  Endocrine:  No hot flashes, increased thirst, or change to urine production  Hematologic: No petechiae, ecchymosis or bleeding  Lymphatic:  No lymphadenopathy or lymphedema  Allergy / Immunologic:  No eczema, hives, frequent or recurrent infections    PHYSICAL EXAM:    Vitals:  Vitals:    01/14/22 0730   BP: 111/64   Pulse: 72   Resp: 20   Temp: 98.5 °F (36.9 °C)   SpO2: 94%      No intake or output data in the 24 hours ending 01/14/22 0944   Wt Readings from Last 3 Encounters:   01/14/22 176 lb (79.8 kg)   07/12/16 195 lb 12.3 oz (88.8 kg)   06/29/16 205 lb (93 kg)        General appearance: Appears comfortable. Eyes: Sclera clear, pupils equal  ENT: Moist mucus membranes, no thrush  Neck: Trachea midline, symmetrical  Cardiovascular: Regular rhythm, normal S1, S2. No murmur, gallop, rub. No edema in  lower extremities  Respiratory: Clear to auscultation bilaterally. No wheeze. Good inspiratory effort  Gastrointestinal: Abdomen soft, not tender, not distended, normal bowel sounds  Musculoskeletal: No cyanosis in digits, warm extremities. + LE edema. Neurologic: Cranial nerves grossly intact, no motor or speech deficits. Psychiatric: Normal affect. Alert and oriented to time, place and person.   Skin: Warm, dry, normal turgor, no rash    DATA:  CBC:   Lab Results   Component Value Date    WBC 5.9 01/14/2022    RBC 3.77 01/14/2022    HGB 11.2 01/14/2022    HCT 33.1 01/14/2022    MCV 87.8 01/14/2022    MCH 29.8 01/14/2022    MCHC 34.0 01/14/2022    RDW 15.1 01/14/2022     01/14/2022    MPV 7.7 01/14/2022     BMP:  Lab Results   Component Value Date     01/14/2022    K 3.4 01/14/2022    CL 98 01/14/2022    CO2 23 01/14/2022    BUN 17 01/14/2022    CREATININE 0.8 01/14/2022    CALCIUM 8.0 01/14/2022    GFRAA >60 01/14/2022    GFRAA >60 03/08/2012    LABGLOM >60 01/14/2022    GLUCOSE 174 01/14/2022     Magnesium:   Lab Results   Component Value Date    MG 1.70 01/14/2022    MG 1.90 01/13/2022     LIVER PROFILE:   Recent Labs     01/13/22  1718 01/14/22  0450   AST 18 15   ALT 9* 8*   BILITOT 0.5 0.4   ALKPHOS 116 101     PT/INR:    Lab Results   Component Value Date    PROTIME 11.0 06/29/2016    INR 0.97 06/29/2016     IMAGING:    Narrative   EXAMINATION:   ONE XRAY VIEW OF THE CHEST       1/13/2022 5:22 pm       COMPARISON:   Chest x-ray dated 07/15/2016       HISTORY:   ORDERING SYSTEM PROVIDED HISTORY: shortness of breath   TECHNOLOGIST PROVIDED HISTORY:   Reason for exam:->shortness of breath   Reason for Exam: Leg Swelling (Pt reports \"fluid retention\" with leg swelling   & being fatigue)       FINDINGS:   Stable appearance of right upper lobe nodule. Small right pleural effusion   with adjacent atelectasis. Mild pulmonary edema. Cardiomegaly. Impression   Small right pleural effusion with adjacent atelectasis. Mild pulmonary edema. Narrative   EXAMINATION:   CTA OF THE CHEST 1/13/2022 8:04 pm       TECHNIQUE:   CTA of the chest was performed after the administration of intravenous   contrast.  Multiplanar reformatted images are provided for review. MIP   images are provided for review. Dose modulation, iterative reconstruction,   and/or weight based adjustment of the mA/kV was utilized to reduce the   radiation dose to as low as reasonably achievable. COMPARISON:   07/15/2016       HISTORY:   ORDERING SYSTEM PROVIDED HISTORY: leg swelling and shortness of breath,   D-dimer > 5250   TECHNOLOGIST PROVIDED HISTORY:   Reason for exam:->leg swelling and shortness of breath, D-dimer > 5250   Decision Support Exception - unselect if not a suspected or confirmed   emergency medical condition->Emergency Medical Condition (MA)   Reason for Exam: leg swelling and shortness of breath, D-dimer > 5250. Fatigue. Leg Swelling (Pt reports \"fluid retention\" with leg swelling ). FINDINGS:   Pulmonary Arteries: There are acute emboli within the lobar and segmental   arteries of the right lung. There is no CT evidence of right heart strain. The main pulmonary artery is chronically enlarged to 40 mm in diameter   suggesting pulmonary hypertension. Mediastinum: No evidence of mediastinal lymphadenopathy. The heart and   pericardium demonstrate no acute abnormality. There is no acute abnormality   of the thoracic aorta. Lungs/pleura: There are moderate left and small right pleural effusions. There is associated passive atelectasis in the lower lobes. There is no   acute consolidation.        Upper Abdomen: There is a large amount of ascites. The liver is cirrhotic. Soft Tissues/Bones: No acute osseous abnormality. Impression   Acute pulmonary emboli. No CT evidence of right heart strain. Moderate left and small right pleural effusions. Large amount of ascites. Probable cirrhosis. IMPRESSION/RECOMMENDATIONS:    Active Problems:    Hypoxia    DVT (deep vein thrombosis)     Pulmonary hypertension (HCC)    Acute hypoxemic respiratory failure (HCC)    Ascites  Resolved Problems:    * No resolved hospital problems. *       75 year old female with a history of DMII, hyperlipidemia and hypertension. She has:    1. Acute pulmonary embolism  -History of provoked PE and DVT  -Current PE is unprovoked. -Dopplers pending.   -Will obtain CT a/p to rule out malignancy.  -Continue Eliquis 10 mg BID. -Will hold off with hypercoagulable workup as this would not change the current plan. -If she finds out what hypercoagulable disorder her son has we will check that. 2. Liver cirrhosis with ascites  -New diagnosis, likely GILMAN. -GI is following. 3. Anemia  -Iron studies, B12 and folate are pending    4. History of renal cell carcinoma  -S/p partial nephrectomy in 2016. This plan was discussed with the patient and he/she verbalized understanding. Thank you for allowing us to participate in the care of this patient. Marizol Vazquez, [facility]  [facility] [address] [place] [address].  [phone]    Patient was seen this evening. Admitted for shortness of breath. Found to have PE.  CT scan showed cirrhosis and ascites. Doppler revealed bilateral acute DVT in her lower extremities. Both legs are swollen. She has history of DVT after surgery in 2008 and that she was treated with Xarelto for about a year. She has history of partial nephrectomy for renal cell carcinoma in 2016. She denies alcohol use. She has lost some weight.   She has family history of thrombosis and her

## 2022-01-14 NOTE — PROGRESS NOTES
Assessment complete. VSS no SOB or any distress noted. Lungs sounds clear and diminished. Pt oriented to room and floor policies. Plan of care discussed and agreed upon. Pt considered scotty fall risk. Pt is in sinus rhythm at this time. Call light within reach, pt encouraged to call if any needs arise. No further requests at this time. Will continue to monitor.

## 2022-01-14 NOTE — CONSULTS
Pharmacy to check patient copays for Eliquis/Xarelto:    Copay for patient will be: $527/month. Remberto Bravo will continue to follow the decision for anticoagulation and  the patient if appropriate.        Atha Denver PharmD, BCPS

## 2022-01-14 NOTE — PROGRESS NOTES
Hospitalist Progress Note      PCP: Katerin Joe MD    Date of Admission: 1/13/2022    Chief Complaint: Leg swelling and fatigue    Hospital Course: 72-year-old female presents with with bilateral lower extremity edema, abdominal distention, and shortness of breath lasting for several weeks. There is history of DVT. Subjective: Patient seen and examined bedside. Medications:  Reviewed    Infusion Medications    sodium chloride       Scheduled Medications    dilTIAZem  120 mg Oral Daily    sodium chloride flush  5-40 mL IntraVENous 2 times per day    apixaban  10 mg Oral BID    Followed by   Radha Franc ON 1/20/2022] apixaban  5 mg Oral BID    hydroCHLOROthiazide  12.5 mg Oral Daily    And    losartan  100 mg Oral Daily    atorvastatin  40 mg Oral Daily    [START ON 1/15/2022] pantoprazole  40 mg Oral QAM AC     PRN Meds: sodium chloride flush, sodium chloride, ondansetron **OR** ondansetron, polyethylene glycol, acetaminophen **OR** acetaminophen, aluminum & magnesium hydroxide-simethicone    No intake or output data in the 24 hours ending 01/14/22 1427    Physical Exam Performed:    /64   Pulse 72   Temp 98.5 °F (36.9 °C) (Oral)   Resp 20   Ht 5' 2\" (1.575 m)   Wt 176 lb (79.8 kg)   SpO2 94%   BMI 32.19 kg/m²     General appearance: No apparent distress, appears stated age and cooperative. HEENT: Pupils equal, round, and reactive to light. Conjunctivae/corneas clear. Neck: Supple, with full range of motion. No jugular venous distention. Trachea midline. Respiratory:  Normal respiratory effort. Clear to auscultation, bilaterally without Rales/Wheezes/Rhonchi. Cardiovascular: Regular rate and rhythm with normal S1/S2 without murmurs, rubs or gallops. Abdomen: Soft, non-tender, non-distended with normal bowel sounds. Musculoskeletal: No clubbing, cyanosis or edema bilaterally. Full range of motion without deformity.   Skin: Skin color, texture, turgor normal.  No rashes or lesions. Neurologic:  Neurovascularly intact without any focal sensory/motor deficits. Cranial nerves: II-XII intact, grossly non-focal.  Psychiatric: Alert and oriented, thought content appropriate, normal insight  Capillary Refill: Brisk,< 3 seconds   Peripheral Pulses: +2 palpable, equal bilaterally       Labs:   Recent Labs     01/13/22  1718 01/14/22  0450   WBC 7.3 5.9   HGB 12.5 11.2*   HCT 37.5 33.1*    228     Recent Labs     01/13/22  1718 01/14/22  0450   * 134*   K 3.4* 3.4*   CL 98* 98*   CO2 20* 23   BUN 17 17   CREATININE 0.7 0.8   CALCIUM 8.3 8.0*     Recent Labs     01/13/22  1718 01/14/22  0450   AST 18 15   ALT 9* 8*   BILITOT 0.5 0.4   ALKPHOS 116 101     Recent Labs     01/14/22  1046   INR 1.51*     No results for input(s): Charles Cage in the last 72 hours. Urinalysis:    No results found for: Tha Henle, BACTERIA, RBCUA, BLOODU, SPECGRAV, Any [address]    Radiology:  CT ABDOMEN PELVIS W IV CONTRAST Additional Contrast? Oral   Final Result   Volume overload including large volume ascites and anasarca. Cirrhotic liver. No hepatic mass is visualized. Mild right hydronephrosis with distal ureteral tapering concerning for   ureteral stricture. RECOMMENDATIONS:   Unavailable         CT CHEST PULMONARY EMBOLISM W CONTRAST   Preliminary Result   Acute pulmonary emboli. No CT evidence of right heart strain. Moderate left and small right pleural effusions. Large amount of ascites. Probable cirrhosis. XR CHEST PORTABLE   Final Result   Small right pleural effusion with adjacent atelectasis. Mild pulmonary edema.          VL Extremity Venous Bilateral    (Results Pending)           Assessment/Plan:    Active Hospital Problems    Diagnosis     Ascites [R18.8]     Pulmonary hypertension ([address] [I27.20]     Acute hypoxemic respiratory failure (HCC) [J96.01]     DVT (deep vein thrombosis)  [O22.30]     Hypoxia [R09.02]      Acute pulmonary emboli  Has history of DVT  Likely related to liver disease  Started on Eliquis upon admission    Ascites  Liver cirrhosis on CT  GI consulted  Work-up in progress    Lower extremity edema  Likely related to liver disease and ascites  Bilateral venous Dopplers were ordered to rule out DVT    DVT Prophylaxis: Eliquis  Diet: ADULT DIET;  Regular; Low Fat/Low Chol/High Fiber/2 gm Na  Code Status: Full Code    Electronically signed by Samantha Turcios MD on 1/14/2022 at 2:27 PM

## 2022-01-15 LAB
ANION GAP SERPL CALCULATED.3IONS-SCNC: 8 MMOL/L (ref 3–16)
ANTI-NUCLEAR ANTIBODY (ANA): POSITIVE
BASOPHILS ABSOLUTE: 0 K/UL (ref 0–0.2)
BASOPHILS RELATIVE PERCENT: 0.7 %
BUN BLDV-MCNC: 15 MG/DL (ref 7–20)
CALCIUM SERPL-MCNC: 7.8 MG/DL (ref 8.3–10.6)
CHLORIDE BLD-SCNC: 101 MMOL/L (ref 99–110)
CO2: 26 MMOL/L (ref 21–32)
CREAT SERPL-MCNC: 0.8 MG/DL (ref 0.6–1.2)
EOSINOPHILS ABSOLUTE: 0.2 K/UL (ref 0–0.6)
EOSINOPHILS RELATIVE PERCENT: 3.3 %
GFR AFRICAN AMERICAN: >60
GFR NON-AFRICAN AMERICAN: >60
GLUCOSE BLD-MCNC: 138 MG/DL (ref 70–99)
GLUCOSE BLD-MCNC: 140 MG/DL (ref 70–99)
GLUCOSE BLD-MCNC: 155 MG/DL (ref 70–99)
GLUCOSE BLD-MCNC: 211 MG/DL (ref 70–99)
HCT VFR BLD CALC: 33.2 % (ref 36–48)
HEMOGLOBIN: 11.2 G/DL (ref 12–16)
LV EF: 68 %
LVEF MODALITY: NORMAL
LYMPHOCYTES ABSOLUTE: 0.6 K/UL (ref 1–5.1)
LYMPHOCYTES RELATIVE PERCENT: 10.4 %
MAGNESIUM: 1.8 MG/DL (ref 1.8–2.4)
MCH RBC QN AUTO: 29.6 PG (ref 26–34)
MCHC RBC AUTO-ENTMCNC: 33.6 G/DL (ref 31–36)
MCV RBC AUTO: 88.1 FL (ref 80–100)
MONOCYTES ABSOLUTE: 0.5 K/UL (ref 0–1.3)
MONOCYTES RELATIVE PERCENT: 8.5 %
NEUTROPHILS ABSOLUTE: 4.7 K/UL (ref 1.7–7.7)
NEUTROPHILS RELATIVE PERCENT: 77.1 %
PDW BLD-RTO: 15.3 % (ref 12.4–15.4)
PERFORMED ON: ABNORMAL
PHOSPHORUS: 3 MG/DL (ref 2.5–4.9)
PLATELET # BLD: 251 K/UL (ref 135–450)
PMV BLD AUTO: 7.1 FL (ref 5–10.5)
POTASSIUM SERPL-SCNC: 4.1 MMOL/L (ref 3.5–5.1)
RBC # BLD: 3.77 M/UL (ref 4–5.2)
SODIUM BLD-SCNC: 135 MMOL/L (ref 136–145)
WBC # BLD: 6.1 K/UL (ref 4–11)

## 2022-01-15 PROCEDURE — 97166 OT EVAL MOD COMPLEX 45 MIN: CPT

## 2022-01-15 PROCEDURE — 80048 BASIC METABOLIC PNL TOTAL CA: CPT

## 2022-01-15 PROCEDURE — 36415 COLL VENOUS BLD VENIPUNCTURE: CPT

## 2022-01-15 PROCEDURE — 97116 GAIT TRAINING THERAPY: CPT

## 2022-01-15 PROCEDURE — 2060000000 HC ICU INTERMEDIATE R&B

## 2022-01-15 PROCEDURE — 97162 PT EVAL MOD COMPLEX 30 MIN: CPT

## 2022-01-15 PROCEDURE — 97530 THERAPEUTIC ACTIVITIES: CPT

## 2022-01-15 PROCEDURE — 2580000003 HC RX 258: Performed by: HOSPITALIST

## 2022-01-15 PROCEDURE — 93306 TTE W/DOPPLER COMPLETE: CPT

## 2022-01-15 PROCEDURE — 6360000002 HC RX W HCPCS: Performed by: FAMILY MEDICINE

## 2022-01-15 PROCEDURE — 97535 SELF CARE MNGMENT TRAINING: CPT

## 2022-01-15 PROCEDURE — 6370000000 HC RX 637 (ALT 250 FOR IP): Performed by: PHYSICIAN ASSISTANT

## 2022-01-15 PROCEDURE — 84100 ASSAY OF PHOSPHORUS: CPT

## 2022-01-15 PROCEDURE — 85025 COMPLETE CBC W/AUTO DIFF WBC: CPT

## 2022-01-15 PROCEDURE — 6370000000 HC RX 637 (ALT 250 FOR IP): Performed by: HOSPITALIST

## 2022-01-15 PROCEDURE — 83735 ASSAY OF MAGNESIUM: CPT

## 2022-01-15 RX ADMIN — HYDROCHLOROTHIAZIDE 12.5 MG: 25 TABLET ORAL at 09:15

## 2022-01-15 RX ADMIN — Medication 10 ML: at 09:48

## 2022-01-15 RX ADMIN — Medication 10 ML: at 20:55

## 2022-01-15 RX ADMIN — LOSARTAN POTASSIUM 100 MG: 100 TABLET, FILM COATED ORAL at 09:16

## 2022-01-15 RX ADMIN — ATORVASTATIN CALCIUM 40 MG: 40 TABLET, FILM COATED ORAL at 09:16

## 2022-01-15 RX ADMIN — ENOXAPARIN SODIUM 80 MG: 100 INJECTION SUBCUTANEOUS at 20:54

## 2022-01-15 RX ADMIN — PANTOPRAZOLE SODIUM 40 MG: 40 TABLET, DELAYED RELEASE ORAL at 09:16

## 2022-01-15 RX ADMIN — ENOXAPARIN SODIUM 80 MG: 100 INJECTION SUBCUTANEOUS at 09:18

## 2022-01-15 RX ADMIN — DILTIAZEM HYDROCHLORIDE 120 MG: 60 CAPSULE, EXTENDED RELEASE ORAL at 09:16

## 2022-01-15 ASSESSMENT — PAIN SCALES - GENERAL
PAINLEVEL_OUTOF10: 0

## 2022-01-15 NOTE — PROGRESS NOTES
Hospitalist Progress Note      PCP: Eliseo Villareal MD    Date of Admission: 1/13/2022    Chief Complaint: Leg swelling and fatigue    Hospital Course: 79-year-old female presents with with bilateral lower extremity edema, abdominal distention, and shortness of breath lasting for several weeks. There is history of DVT. Subjective: Patient seen and examined bedside. Medications:  Reviewed    Infusion Medications    sodium chloride       Scheduled Medications    dilTIAZem  120 mg Oral Daily    sodium chloride flush  5-40 mL IntraVENous 2 times per day    hydroCHLOROthiazide  12.5 mg Oral Daily    And    losartan  100 mg Oral Daily    atorvastatin  40 mg Oral Daily    pantoprazole  40 mg Oral QAM AC    enoxaparin  1 mg/kg SubCUTAneous BID     PRN Meds: sodium chloride flush, sodium chloride, ondansetron **OR** ondansetron, polyethylene glycol, acetaminophen **OR** acetaminophen, aluminum & magnesium hydroxide-simethicone, perflutren lipid microspheres      Intake/Output Summary (Last 24 hours) at 1/15/2022 1553  Last data filed at 1/15/2022 0806  Gross per 24 hour   Intake 370 ml   Output 150 ml   Net 220 ml       Physical Exam Performed:    /66   Pulse 74   Temp 97.8 °F (36.6 °C) (Oral)   Resp 16   Ht 5' 2\" (1.575 m)   Wt 176 lb (79.8 kg)   SpO2 95%   BMI 32.19 kg/m²     General appearance: No apparent distress, appears stated age and cooperative. HEENT: Pupils equal, round, and reactive to light. Conjunctivae/corneas clear. Neck: Supple, with full range of motion. No jugular venous distention. Trachea midline. Respiratory:  Normal respiratory effort. Clear to auscultation, bilaterally without Rales/Wheezes/Rhonchi. Cardiovascular: Regular rate and rhythm with normal S1/S2 without murmurs, rubs or gallops. Abdomen: Soft, non-tender, non-distended with normal bowel sounds. Musculoskeletal: No clubbing, cyanosis or edema bilaterally.   Full range of motion without deformity. Skin: Skin color, texture, turgor normal.  No rashes or lesions. Neurologic:  Neurovascularly intact without any focal sensory/motor deficits. Cranial nerves: II-XII intact, grossly non-focal.  Psychiatric: Alert and oriented, thought content appropriate, normal insight  Capillary Refill: Brisk,< 3 seconds   Peripheral Pulses: +2 palpable, equal bilaterally       Labs:   Recent Labs     01/13/22 1718 01/13/22  1718 01/14/22  0450 01/14/22  0450 01/14/22  1756 01/14/22  2148 01/15/22  0538   WBC 7.3  --  5.9  --   --   --  6.1   HGB 12.5   < > 11.2*   < > 11.9* 10.9* 11.2*   HCT 37.5   < > 33.1*   < > 35.7* 33.0* 33.2*     --  228  --   --   --  251    < > = values in this interval not displayed. Recent Labs     01/13/22 1718 01/14/22  0450 01/15/22  0538   * 134* 135*   K 3.4* 3.4* 4.1   CL 98* 98* 101   CO2 20* 23 26   BUN 17 17 15   CREATININE 0.7 0.8 0.8   CALCIUM 8.3 8.0* 7.8*   PHOS  --   --  3.0     Recent Labs     01/13/22 1718 01/14/22  0450   AST 18 15   ALT 9* 8*   BILITOT 0.5 0.4   ALKPHOS 116 101     Recent Labs     01/14/22  1046   INR 1.51*     No results for input(s): Mery Moreno in the last 72 hours. Urinalysis:    No results found for: Tor Bertrand, BACTERIA, RBCUA, BLOODU, SPECGRAV, Any [address] [place]    Radiology:  VL Extremity Venous Bilateral         CT ABDOMEN PELVIS W IV CONTRAST Additional Contrast? Oral   Final Result   Volume overload including large volume ascites and anasarca. Cirrhotic liver. No hepatic mass is visualized. Mild right hydronephrosis with distal ureteral tapering concerning for   ureteral stricture. RECOMMENDATIONS:   Unavailable         CT CHEST PULMONARY EMBOLISM W CONTRAST   Preliminary Result   Acute pulmonary emboli. No CT evidence of right heart strain. Moderate left and small right pleural effusions. Large amount of ascites. Probable cirrhosis.          XR CHEST PORTABLE   Final Result   Small right pleural effusion with adjacent atelectasis. Mild pulmonary edema. Assessment/Plan:    Active Hospital Problems    Diagnosis     Ascites [R18.8]     Pulmonary hypertension ([address] [I27.20]     Acute hypoxemic respiratory failure (HCC) [J96.01]     DVT (deep vein thrombosis)  [O22.30]     Hypoxia [R09.02]      Acute pulmonary emboli  Has history of DVT  Likely related to liver disease  Started on Eliquis upon admission    Ascites  Liver cirrhosis on CT  GI consulted  Work-up in progress    Lower extremity edema  Likely related to liver disease and ascites  Bilateral venous Dopplers were ordered to rule out DVT    DVT Prophylaxis: Eliquis  Diet: ADULT DIET;  Regular; Low Fat/Low Chol/High Fiber/2 gm Na  Code Status: Full Code    Electronically signed by Judd Driver MD on 1/15/2022 at 3:53 PM

## 2022-01-15 NOTE — PROGRESS NOTES
Physical Therapy    Facility/Department: [address]  Initial Assessment    NAME: Erica Martinez  : 1940  MRN: [id number]    Date of Service: 1/15/2022    Discharge Recommendations: Erica Martinez scored a 18/24 on the AM-PAC short mobility form. Current research shows that an AM-PAC score of 18 or greater is typically associated with a discharge to the patient's home setting. Based on the patient's AM-PAC score and their current functional mobility deficits, it is recommended that the patient have 2-3 sessions per week of Physical Therapy at d/c to increase the patient's independence. At this time, this patient demonstrates the endurance and safety to discharge home with HHPT and a follow up treatment frequency of 2-3x/wk. Please see assessment section for further patient specific details. If patient discharges prior to next session this note will serve as a discharge summary. Please see below for the latest assessment towards goals. HOME HEALTH CARE: LEVEL 3 SAFETY     - Initial home health evaluation to occur within 24-48 hours, in patient home   - Therapy evaluations in home within 24-48 hours of discharge; including DME and home safety   - Frontload therapy 5 days, then 3x a week   - Therapy to evaluate if patient has [address] needs for personal care   -  evaluation within 24-48 hours, includes evaluation of resources and insurance to determine AL, [place], LTC, and Medicaid options   PT Equipment Recommendations  Equipment Needed: No    Assessment   Body structures, Functions, Activity limitations: Decreased functional mobility ; Decreased safe awareness;Decreased endurance;Decreased balance  Assessment: Pt presents to [address] [place] [address] from home alone with B LE swelling. Pt is independent at baseline and has 5 children who live in town that assist with household chores as needed. Pt reports one fall a couple weeks ago.  At this time, pt completes transfers with SBA and ambulates 200 ft with no AD and CGA. Pt can be impulsive/have decreased safety awareness at times. Pt would continue to benefit from skilled PT in order to reduce risk of falls and safely return to prior level of independence. Treatment Diagnosis: decreased balance, decreased endurance  Prognosis: Good  Decision Making: Medium Complexity  Clinical Presentation: stable  PT Education: Goals;PT Role;Plan of Care  Patient Education: discharge recommendations discussed. Pt verbalized understanding  Barriers to Learning: [place]  REQUIRES PT FOLLOW UP: Yes  Activity Tolerance  Activity Tolerance: Patient Tolerated treatment well  Activity Tolerance: Pt on 2 L of O2 remaining 92%-96% throughout session. Patient Diagnosis(es): The primary encounter diagnosis was Acute pulmonary embolism without acute cor pulmonale, unspecified pulmonary embolism type ([address] Diagnoses of Cirrhosis of liver with ascites, unspecified hepatic cirrhosis type ([address] and Pleural effusion, bilateral were also pertinent to this visit. has a past medical history of Cancer ([address] Diabetes mellitus ([address] DVT, lower extremity ([address] Heart murmur, Hyperlipidemia, Hypertension, and Pulmonary embolism ([address] has a past surgical history that includes Skin cancer excision; Hysterectomy; Colonoscopy; other surgical history (11/21/13); Cystocopy (11/21/13); Appendectomy; and partial nephrectomy (Right, 07/12/2016). Restrictions  Restrictions/Precautions  Restrictions/Precautions: Fall Risk (high fall risk)  Required Braces or Orthoses?: No  Position Activity Restriction  Other position/activity restrictions: Mayda Villafana is a 80 y.o. female on Maritza Ribeiro MD service who was admitted on 1/13/2022 for fatigue, shortness of breath and and leg swelling. D-dimer was > 5250 on admission. CT pulmonary angiogram shows acute emboli within the lobar and segmental arteries of the right lung, no evidence of right heart strain.  Cirrhotic liver and large amount of ascites was also noted. She denies history of liver disease. She has a history of DVT after hip surgery in 2008. She also had PE and DVT in 2016 after partial nephrectomy for renal cell carcinoma in 2016. She was treated with Xarelto. She did not require adjuvant therapy for her cancer. She denies recent surgery, travel and injury. She denies sedentary lifestyle. She reports that her son and her granddaughter have a blood clotting disorder but does not know what mutation. Vision/Hearing  Vision: Impaired  Vision Exceptions: Wears glasses for reading  Hearing: Exceptions to [facility]  Hearing Exceptions: Hard of hearing/hearing concerns;Bilateral hearing aid ([place] even with hearing aides)     Subjective  General  Chart Reviewed: Yes  Patient assessed for rehabilitation services?: Yes  Response To Previous Treatment: Not applicable  Family / Caregiver Present: Yes (daughter)  Follows Commands: Within Functional Limits  General Comment  Comments: Pt seated in recliner upon arrival. Pt agreeable to PT/OT eval. Pt on 2 L O2 resting at SpO2 93%  Subjective  Subjective: Pt reports no pain at this time.   Pain Screening  Patient Currently in Pain: Denies          Orientation  Orientation  Overall Orientation Status: Within Functional Limits  Social/Functional History  Social/Functional History  Lives With: Alone  Type of Home: House  Home Layout: Laundry in basement  Home Access: Stairs to enter with rails  Entrance Stairs - Number of Steps: 3 NANO  Bathroom Shower/Tub: Tub/Shower unit  Bathroom Toilet: Standard (has furniture close by to hold onto)  [employer]: Grab bars in [address]: Cane,Rolling walker  ADL Assistance: [address]: Needs assistance (pt has 5 childen that helps with meals, children help with cleaning occaisionally)  Ambulation Assistance: Independent  Transfer Assistance: Independent  Active : Yes  Leisure & Hobbies: reading, kaitlyn, watch TV  Additional Comments: one fall in kitchen a couple weeks ago, pt reports she doesnt know what happened she just fell  Cognition   Cognition  Overall Cognitive Status: [facility]  Cognition Comment: [place]    Objective     Observation/Palpation  Observation: brusing on B UEs  Edema: pitting edema B LEs    AROM RLE (degrees)  RLE AROM: WFL  AROM LLE (degrees)  LLE AROM : WFL  Strength RLE  Strength RLE: Exception  Comment: Gross MMT 3+/5  Strength LLE  Strength LLE: Exception  Comment: Gross MMT 3+5  Tone RLE  RLE Tone: Normotonic  Tone LLE  LLE Tone: Normotonic  Sensation  Overall Sensation Status: [facility]  Bed mobility  Comment: unable to assess, pt in recliner at beginning and EOS  Transfers  Sit to Stand: Stand by assistance  Stand to sit: Stand by assistance  Comment: From recliner, from toilet, [facility]  Ambulation  Ambulation?: Yes  Ambulation 1  Surface: level tile  Device: No Device  Other Apparatus: O2 (2 L)  Assistance: Contact guard assistance  Quality of Gait: fast tsering, deviated path, occasionally reaches out for environmental support, foward flexed posterior with forward lean during ambulation, decreased arm swing  Gait Deviations: Decreased arm swing;Deviated path  Distance: 10 ft +10 ft +100 ft  Comments: Pt ambulates 10 ft x2 to and from bathroom with CGA. Pt ambulates 200 ft in solares with CGA and no AD. Pt SpO2 after ambulation 94%  Stairs/Curb  Stairs?: No     Balance  Posture: Fair  Sitting - Static: Good  Sitting - Dynamic: Good  Standing - Static: Fair;+ (SBA)  Standing - Dynamic: Fair (CGA)  Comments: Pt completed ADLs in bathroom with OT, standing for 25% of activities and sitting for 75%. See OT notes . Plan   Plan  Times per week: 3-5x  Times per day: Daily  Current Treatment Recommendations: Strengthening,Balance Training,Functional Mobility Training,Transfer Training,Endurance Training,Gait Training,Stair training  Safety Devices  Type of devices:  All fall risk precautions in place,Call light within reach,Gait belt,Patient at risk for falls,Chair alarm in place,Left in chair,Nurse notified  Restraints  Initially in place: No    G-Code       OutComes Score                                                  AM-PAC Score  AM-PAC Inpatient Mobility Raw Score : 18 (01/15/22 1251)  AM-PAC Inpatient T-Scale Score : 43.63 (01/15/22 1251)  Mobility Inpatient CMS 0-100% Score: 46.58 (01/15/22 1251)  Mobility Inpatient CMS G-Code Modifier : CK (01/15/22 1251)          Goals  Short term goals  Time Frame for Short term goals: upon discharge  Short term goal 1: Pt will be able to complete bed mobility with indep  Short term goal 2: Pt will be able to complete transfers with indep  Short term goal 3: Pt will be able to ambulate 150 ft with LRAD and mod I  Short term goal 4: Pt will be able to ascend/descend 4 steps with SBA       Therapy Time   Individual Concurrent Group Co-treatment   Time In 1111         Time Out 1206         Minutes 55              Timed Code Treatment Minutes:   40    Total Treatment Minutes:  [address]

## 2022-01-15 NOTE — PLAN OF CARE
Shift assessment complete. Pt oxygen saturation was low 80s on room air. Pt placed on 2L nasal cannula and oxygen saturation is now mid 90s. Other VSS. Pt is alert and oriented x4. Pt ambulated to the bathroom and returned to bed without difficulty as SBA. Evening medications administered per MAR. Pt states Gerry Carpenter is very tired after all of her testing today and would like to be left alone to rest\". Lights dimmed, pt bundled in warm blankets. Call light within reach. Bed alarm engaged. Pt encouraged to call for assistance. No signs of distress. No further requests at this time.      Problem: Falls - Risk of:  Goal: Will remain free from falls  Description: Will remain free from falls  1/14/2022 2054 by Víctor Sutherland RN  Outcome: Ongoing     Problem: ABCDS Injury Assessment  Goal: Absence of physical injury  1/14/2022 2054 by Víctor Sutherland RN  Outcome: Ongoing

## 2022-01-15 NOTE — PROGRESS NOTES
Gastroenterology Progress Note      Christen Polo is a 80 y.o. female patient. Active Problems:    Hypoxia    DVT (deep vein thrombosis)     Pulmonary hypertension (HCC)    Acute hypoxemic respiratory failure (HCC)    Ascites  Resolved Problems:    * No resolved hospital problems. *      SUBJECTIVE:  Patient is doing better this morning  She has less swelling in her leg  And she is breathing easier    ROS:  No fever, chills  No chest pain, palpitations  No SOB, cough  Gastrointestinal ROS: no abdominal pain, nausea, vomiting     Physical    VITALS:  /71   Pulse 71   Temp 97.7 °F (36.5 °C) (Oral)   Resp 16   Ht 5' 2\" (1.575 m)   Wt 176 lb (79.8 kg)   SpO2 91%   BMI 32.19 kg/m²   TEMPERATURE:  Current - Temp: 97.7 °F (36.5 °C); Max - Temp  Av °F (36.7 °C)  Min: 97.7 °F (36.5 °C)  Max: 98.3 °F (36.8 °C)    NAD  RRR, Nl s1s2  Lungs CTA Bilaterally, normal effort  Abdomen soft, ND, Bowel sounds normal.    Data    Data Review:    Recent Labs     22  1756 01/14/22  2148 01/15/22  0538   WBC 7.3  --  5.9  --   --   --  6.1   HGB 12.5   < > 11.2*   < > 11.9* 10.9* 11.2*   HCT 37.5   < > 33.1*   < > 35.7* 33.0* 33.2*   MCV 89.2  --  87.8  --   --   --  88.1     --  228  --   --   --  251    < > = values in this interval not displayed. Recent Labs     01/13/22  1718 01/14/22  0450 01/15/22  0538   * 134* 135*   K 3.4* 3.4* 4.1   CL 98* 98* 101   CO2 20* 23 26   PHOS  --   --  3.0   BUN 17 17 15   CREATININE 0.7 0.8 0.8     Recent Labs     22   AST 18 15   ALT 9* 8*   BILITOT 0.5 0.4   ALKPHOS 116 101     No results for input(s): LIPASE, AMYLASE in the last 72 hours. Recent Labs     22  1046   PROTIME 17.3*   INR 1.51*     No results for input(s): PTT in the last 72 hours.     Radiology Review:       ASSESSMENT   Possible cirrhosis of the liver  Ascites  Question of vomiting blood versus potassium effervescence        PLAN    As far as vomiting hemoglobin is stable therefore I do not think that she was vomiting blood I think this was the    Ascites we are trying to arrange for the patient to have a paracentesis at some point    Chronic liver disease work-up showed the patient has a positive CLAUDIA but it does not tell us whether or not this is like 1-80 or 1- 640 reports hard to say that she has an autoimmune hepatitis at this point however did    Her viral hepatitis panel as far as the acute setting is negative  Still waiting some other hepatitis result    Thank you for this kind referral today    [email or web address] [facility],[address]

## 2022-01-15 NOTE — PROGRESS NOTES
Pt is resting in bed with her eyes closed. Opens her eyes to touch. RR >12. Pt denies need to use bathroom or be repositioned at this time. Call light within reach. Bed alarm engaged.

## 2022-01-15 NOTE — PROGRESS NOTES
Occupational Therapy   Occupational Therapy Initial Assessment  Date: 1/15/2022   Patient Name: Jannette Vega  MRN: [id number]     : 1940    Date of Service: 1/15/2022    Discharge Recommendations: Jannette Vega scored a 19/24 on the AM-PAC ADL Inpatient form. Current research shows that an AM-PAC score of 18 or greater is typically associated with a discharge to the patient's home setting. Based on the patient's AM-PAC score, and their current ADL deficits, it is recommended that the patient have 2-3 sessions per week of Occupational Therapy at d/c to increase the patient's independence. At this time, this patient demonstrates the endurance and safety to discharge home with [facility] and a follow up treatment frequency of 2-3x/wk. Please see assessment section for further patient specific details. HOME HEALTH CARE: LEVEL 1 STANDARD    - Initial home health evaluation to occur within 24-48 hours, in patient home   - Therapy to evaluate with goal of regaining prior level of functioning   - Therapy to evaluate if patient has [id number] [facility] needs for personal care      If patient discharges prior to next session this note will serve as a discharge summary. Please see below for the latest assessment towards goals. OT Equipment Recommendations  Equipment Needed: Yes  Mobility Devices: ADL Assistive Devices  ADL Assistive Devices: Shower Chair with back  Other: for energy conservation    Assessment   Performance deficits / Impairments: Decreased functional mobility ; Decreased ADL status; Decreased endurance;Decreased high-level IADLs  Assessment: Pt is not at baseline level of function and will benefit from continued OT to address the above limitations. Treatment Diagnosis: Decreased functional mobility, ADL/IADL status, activity tolerance related to ascites  Prognosis: Good  Decision Making: Medium Complexity  OT Education: OT Role;Plan of Care;ADL Adaptive Strategies; Energy Conservation  Patient Education: d/c recommendations - pt verbalized understanding  Barriers to Learning: hearing  REQUIRES OT FOLLOW UP: Yes  Activity Tolerance  Activity Tolerance: Patient Tolerated treatment well  Activity Tolerance: Required occasional rest breaks secondary to SOB. O2 sats ranged between 91% and 96% on 2L  Safety Devices  Safety Devices in place: Yes  Type of devices: All fall risk precautions in place; Left in chair;Nurse notified;Call light within reach; Chair alarm in place; Patient at risk for falls           Patient Diagnosis(es): The primary encounter diagnosis was Acute pulmonary embolism without acute cor pulmonale, unspecified pulmonary embolism type ([address] Diagnoses of Cirrhosis of liver with ascites, unspecified hepatic cirrhosis type ([address] and Pleural effusion, bilateral were also pertinent to this visit. has a past medical history of Cancer ([facility] [address] Diabetes mellitus ([address] DVT, lower extremity ([address] Heart murmur, Hyperlipidemia, Hypertension, and Pulmonary embolism ([facility] [address] has a past surgical history that includes Skin cancer excision; Hysterectomy; Colonoscopy; other surgical history (11/21/13); Cystocopy (11/21/13); Appendectomy; and partial nephrectomy (Right, 07/12/2016). Treatment Diagnosis: Decreased functional mobility, ADL/IADL status, activity tolerance related to ascites      Restrictions  Restrictions/Precautions  Restrictions/Precautions: Fall Risk (high fall risk)  Required Braces or Orthoses?: No  Position Activity Restriction  Other position/activity restrictions: JAROCHO RAMÍREZ is a 80 y.o. female on Angela Matos MD service who was admitted on 1/13/2022 for fatigue, shortness of breath and and leg swelling. D-dimer was > 5250 on admission. CT pulmonary angiogram shows acute emboli within the lobar and segmental arteries of the right lung, no evidence of right heart strain. Cirrhotic liver and large amount of ascites was also noted. She denies history of liver disease.  She has a history of DVT after hip surgery in 2008. She also had PE and DVT in 2016 after partial nephrectomy for renal cell carcinoma in 2016. She was treated with Xarelto. She did not require adjuvant therapy for her cancer. She denies recent surgery, travel and injury. She denies sedentary lifestyle. She reports that her son and her granddaughter have a blood clotting disorder but does not know what mutation. Subjective   General  Chart Reviewed: Yes  Patient assessed for rehabilitation services?: Yes  Additional Pertinent Hx: skin cancer, DVT, DM  Family / Caregiver Present: Yes (daughter)  Diagnosis: ascites  Subjective  Subjective: Pt sitting in recliner upon arrival, agreeable to evaluation. Pt looking forward to getting a bath.   Patient Currently in Pain: Denies  Pulse Oximeter Device Mode: Intermittent  Pulse Oximeter Device Location: Finger  O2 Device: Nasal cannula  O2 Flow Rate (L/min): 2 L/min  Social/Functional History  Social/Functional History  Lives With: Alone  Type of Home: House  Home Layout: Laundry in basement  Home Access: Stairs to enter with rails  Entrance Stairs - Number of Steps: 3 NANO  Bathroom Shower/Tub: Tub/Shower unit  Bathroom Toilet: Standard (has furniture close by to hold onto)  Pixel Press: Grab bars in shower  Home Equipment: Cane,Rolling walker  ADL Assistance: [address]: Needs assistance (pt has 5 childen that helps with meals, children help with cleaning occaisionally)  Ambulation Assistance: Independent  Transfer Assistance: Independent  Active : Yes  Leisure & Hobbies: reading, kaitlyn, watch TV  Additional Comments: one fall in kitchen a couple weeks ago, pt reports she doesnt know what happened she just fell       Objective   Vision: Impaired  Vision Exceptions: Wears glasses for reading  Hearing: Exceptions to [facility]  Hearing Exceptions: Hard of hearing/hearing concerns;Bilateral hearing aid ([facility] even with hearing aides) Orientation  Overall Orientation Status: Within Normal Limits  Observation/Palpation  Posture: Good  Observation: brusing on B UEs  Edema: pitting edema B LEs  Balance  Sitting Balance: Supervision  Standing Balance: Stand by assistance (during ADL tasks at sink; CGA for mobility)  Standing Balance  Time: up to 10-12 min total  Activity: functional mobility to/from bathroom, ADLs  Comment: pt slightly unsteady, however no LOB; no AD  Functional Mobility  Functional - Mobility Device: No device  Activity: To/from bathroom; Other  Assist Level: Contact guard assistance  Functional Mobility Comments: x10', x20', 2x100'  Toilet Transfers  Toilet - Technique: Ambulating  Equipment Used: Standard toilet  Toilet Transfer: Stand by assistance  ADL  Grooming: Stand by assistance;Setup  UE Bathing: Stand by assistance;Setup  LE Bathing: Stand by assistance;Setup  UE Dressing: Stand by assistance;Setup  LE Dressing: Stand by assistance;Setup; Increased time to complete  Toileting: Stand by assistance;Setup  Additional Comments: Pt doffed slippers and donned non-slip socks seated in recliner, supervision. Pt ambulated to bathroom without AD, toileted and completed bathing/dressing in stance at sink and seated on BSC. Pt stood up to 6-8 min and completed the remaining ADLs seated on BSC. Pt audibly SOB at times. Pt took rest breaks as needed without cueing. Lowest O2 sats 91%. Pt ambulated back to room and into hallway following ADLs. Tone RUE  RUE Tone: Normotonic  Tone LUE  LUE Tone: Normotonic  Coordination  Movements Are Fluid And Coordinated: Yes        Transfers  Sit to stand: Stand by assistance  Stand to sit: Stand by assistance  Vision - Basic Assessment  Prior Vision: Wears glasses only for reading  Patient Visual Report: No visual complaint reported.   Cognition  Overall Cognitive Status: WFL  Cognition Comment: [place]        Sensation  Overall Sensation Status: WFL        LUE AROM (degrees)  LUE AROM : WFL  RUE AROM (degrees)  RUE AROM : WFL  LUE Strength  Gross LUE Strength: WFL  RUE Strength  Gross RUE Strength: WFL                   Plan   Plan  Times per week: 3-5x  Times per day: Daily  Current Treatment Recommendations: Functional Mobility Training,Safety Education & Training,Self-Care / ADL,Patient/Caregiver Education & Training,Equipment Evaluation, Education, & procurement,Home Management Training,Endurance Training             AM-PAC Score        AM-PAC Inpatient Daily Activity Raw Score: 19 (01/15/22 1251)  AM-PAC Inpatient ADL T-Scale Score : 40.22 (01/15/22 1251)  ADL Inpatient CMS 0-100% Score: 42.8 (01/15/22 1251)  ADL Inpatient CMS G-Code Modifier : CK (01/15/22 1251)    Goals  Short term goals  Time Frame for Short term goals: Discharge  Short term goal 1: Mod I for all UB ADLs. Short term goal 2: Mod I for all LB ADLs. Short term goal 3: Mod I for functional mobility. Short term goal 4: Mod I for functional transfers. Short term goal 5: Mod I for light IADL task.   Long term goals  Time Frame for Long term goals : STG=LTG  Patient Goals   Patient goals : To d/c home       Therapy Time   Individual Concurrent Group Co-treatment   Time In 1111         Time Out 1206         Minutes 55              Timed Code Treatment Minutes:   40    Total Treatment Minutes:  [address] [facility],    Liyah (Mary Beth), [address] [place], [place] [id number]

## 2022-01-16 LAB
ANION GAP SERPL CALCULATED.3IONS-SCNC: 10 MMOL/L (ref 3–16)
BASOPHILS ABSOLUTE: 0 K/UL (ref 0–0.2)
BASOPHILS RELATIVE PERCENT: 0.8 %
BUN BLDV-MCNC: 15 MG/DL (ref 7–20)
CALCIUM SERPL-MCNC: 7.7 MG/DL (ref 8.3–10.6)
CHLORIDE BLD-SCNC: 100 MMOL/L (ref 99–110)
CO2: 24 MMOL/L (ref 21–32)
CREAT SERPL-MCNC: 0.8 MG/DL (ref 0.6–1.2)
EOSINOPHILS ABSOLUTE: 0.2 K/UL (ref 0–0.6)
EOSINOPHILS RELATIVE PERCENT: 3.6 %
GFR AFRICAN AMERICAN: >60
GFR NON-AFRICAN AMERICAN: >60
GLUCOSE BLD-MCNC: 131 MG/DL (ref 70–99)
GLUCOSE BLD-MCNC: 142 MG/DL (ref 70–99)
GLUCOSE BLD-MCNC: 162 MG/DL (ref 70–99)
GLUCOSE BLD-MCNC: 181 MG/DL (ref 70–99)
HAV AB SERPL IA-ACNC: POSITIVE
HCT VFR BLD CALC: 32.3 % (ref 36–48)
HEMOGLOBIN: 10.9 G/DL (ref 12–16)
LYMPHOCYTES ABSOLUTE: 0.6 K/UL (ref 1–5.1)
LYMPHOCYTES RELATIVE PERCENT: 11.4 %
MAGNESIUM: 1.9 MG/DL (ref 1.8–2.4)
MCH RBC QN AUTO: 29.7 PG (ref 26–34)
MCHC RBC AUTO-ENTMCNC: 33.8 G/DL (ref 31–36)
MCV RBC AUTO: 88.1 FL (ref 80–100)
MONOCYTES ABSOLUTE: 0.4 K/UL (ref 0–1.3)
MONOCYTES RELATIVE PERCENT: 8.4 %
NEUTROPHILS ABSOLUTE: 4 K/UL (ref 1.7–7.7)
NEUTROPHILS RELATIVE PERCENT: 75.8 %
PDW BLD-RTO: 15.4 % (ref 12.4–15.4)
PERFORMED ON: ABNORMAL
PHOSPHORUS: 3.1 MG/DL (ref 2.5–4.9)
PLATELET # BLD: 281 K/UL (ref 135–450)
PMV BLD AUTO: 7.6 FL (ref 5–10.5)
POTASSIUM SERPL-SCNC: 3.7 MMOL/L (ref 3.5–5.1)
RBC # BLD: 3.66 M/UL (ref 4–5.2)
SODIUM BLD-SCNC: 134 MMOL/L (ref 136–145)
WBC # BLD: 5.3 K/UL (ref 4–11)

## 2022-01-16 PROCEDURE — 2580000003 HC RX 258: Performed by: HOSPITALIST

## 2022-01-16 PROCEDURE — 6370000000 HC RX 637 (ALT 250 FOR IP): Performed by: HOSPITALIST

## 2022-01-16 PROCEDURE — 83735 ASSAY OF MAGNESIUM: CPT

## 2022-01-16 PROCEDURE — 6370000000 HC RX 637 (ALT 250 FOR IP): Performed by: PHYSICIAN ASSISTANT

## 2022-01-16 PROCEDURE — 36415 COLL VENOUS BLD VENIPUNCTURE: CPT

## 2022-01-16 PROCEDURE — 85025 COMPLETE CBC W/AUTO DIFF WBC: CPT

## 2022-01-16 PROCEDURE — 80048 BASIC METABOLIC PNL TOTAL CA: CPT

## 2022-01-16 PROCEDURE — 84100 ASSAY OF PHOSPHORUS: CPT

## 2022-01-16 PROCEDURE — 2060000000 HC ICU INTERMEDIATE R&B

## 2022-01-16 PROCEDURE — 6360000002 HC RX W HCPCS: Performed by: FAMILY MEDICINE

## 2022-01-16 RX ADMIN — LOSARTAN POTASSIUM 100 MG: 100 TABLET, FILM COATED ORAL at 10:21

## 2022-01-16 RX ADMIN — DILTIAZEM HYDROCHLORIDE 120 MG: 60 CAPSULE, EXTENDED RELEASE ORAL at 10:21

## 2022-01-16 RX ADMIN — HYDROCHLOROTHIAZIDE 12.5 MG: 25 TABLET ORAL at 10:21

## 2022-01-16 RX ADMIN — Medication 10 ML: at 21:14

## 2022-01-16 RX ADMIN — ENOXAPARIN SODIUM 80 MG: 100 INJECTION SUBCUTANEOUS at 10:22

## 2022-01-16 RX ADMIN — ENOXAPARIN SODIUM 80 MG: 100 INJECTION SUBCUTANEOUS at 21:14

## 2022-01-16 RX ADMIN — ATORVASTATIN CALCIUM 40 MG: 40 TABLET, FILM COATED ORAL at 10:22

## 2022-01-16 RX ADMIN — PANTOPRAZOLE SODIUM 40 MG: 40 TABLET, DELAYED RELEASE ORAL at 06:59

## 2022-01-16 RX ADMIN — Medication 10 ML: at 10:23

## 2022-01-16 ASSESSMENT — PAIN SCALES - GENERAL
PAINLEVEL_OUTOF10: 0

## 2022-01-16 NOTE — PLAN OF CARE
Problem: Falls - Risk of:  Goal: Absence of physical injury  Description: Absence of physical injury  1/16/2022 1212 by Pio Collier RN  Outcome: Ongoing  1/16/2022 0056 by Mallory Slade RN  Outcome: Ongoing     Problem: Falls - Risk of:  Goal: Will remain free from falls  Description: Will remain free from falls  1/16/2022 1212 by Pio Collier RN  Outcome: Ongoing  1/16/2022 0056 by Mallory Slade RN  Outcome: Ongoing

## 2022-01-16 NOTE — PROGRESS NOTES
Gastroenterology Progress Note      Danish Jimenez is a 80 y.o. female patient. Active Problems:    Hypoxia    DVT (deep vein thrombosis)     Pulmonary hypertension (HCC)    Acute hypoxemic respiratory failure (HCC)    Ascites  Resolved Problems:    * No resolved hospital problems. *      SUBJECTIVE: We have been asked to see and evaluate this patient because she has ascites and a CT scan which also questions a nodular liver possible cirrhosis    Serological work-up to point has been negative    Patient does have ascites and we will plan for her to have a ultrasound-guided paracentesis tomorrow    Today the patient has no overt complaints    ROS:  No fever, chills  No chest pain, palpitations  No SOB, cough  Gastrointestinal ROS: no abdominal pain, nausea, vomiting     Physical    VITALS:  /73   Pulse 67   Temp 97.6 °F (36.4 °C) (Oral)   Resp 18   Ht 5' 2\" (1.575 m)   Wt 174 lb 4.8 oz (79.1 kg)   SpO2 94%   BMI 31.88 kg/m²   TEMPERATURE:  Current - Temp: 97.6 °F (36.4 °C); Max - Temp  Av.9 °F (36.6 °C)  Min: 97.3 °F (36.3 °C)  Max: 98.6 °F (37 °C)    NAD  RRR, Nl s1s2  Lungs CTA Bilaterally, normal effort  Abdomen soft, ND, NT, no HSM, Bowel sounds normal.    Data    Data Review:    Recent Labs     22  0450 22  1756 22  2148 01/15/22  0538 22  0602   WBC 5.9  --   --  6.1 5.3   HGB 11.2*   < > 10.9* 11.2* 10.9*   HCT 33.1*   < > 33.0* 33.2* 32.3*   MCV 87.8  --   --  88.1 88.1     --   --  251 281    < > = values in this interval not displayed. Recent Labs     22  0450 01/15/22  0538 22  0602   * 135* 134*   K 3.4* 4.1 3.7   CL 98* 101 100   CO2 23 26 24   PHOS  --  3.0 3.1   BUN 17 15 15   CREATININE 0.8 0.8 0.8     Recent Labs     22  1718 22  0450   AST 18 15   ALT 9* 8*   BILITOT 0.5 0.4   ALKPHOS 116 101     No results for input(s): LIPASE, AMYLASE in the last 72 hours.   Recent Labs     22  1046   PROTIME 17.3*   INR

## 2022-01-16 NOTE — PROGRESS NOTES
Hospitalist Progress Note      PCP: Maritza Maldonado MD    Date of Admission: 1/13/2022    Chief Complaint: Leg swelling and fatigue    Hospital Course: 57-year-old female presents with with bilateral lower extremity edema, abdominal distention, and shortness of breath lasting for several weeks. There is history of DVT. Subjective: Patient seen and examined bedside. Medications:  Reviewed    Infusion Medications    sodium chloride       Scheduled Medications    dilTIAZem  120 mg Oral Daily    sodium chloride flush  5-40 mL IntraVENous 2 times per day    hydroCHLOROthiazide  12.5 mg Oral Daily    And    losartan  100 mg Oral Daily    atorvastatin  40 mg Oral Daily    pantoprazole  40 mg Oral QAM AC    enoxaparin  1 mg/kg SubCUTAneous BID     PRN Meds: sodium chloride flush, sodium chloride, ondansetron **OR** ondansetron, polyethylene glycol, acetaminophen **OR** acetaminophen, aluminum & magnesium hydroxide-simethicone, perflutren lipid microspheres      Intake/Output Summary (Last 24 hours) at 1/16/2022 1434  Last data filed at 1/16/2022 0700  Gross per 24 hour   Intake    Output 500 ml   Net -500 ml       Physical Exam Performed:    /68   Pulse 79   Temp 97.6 °F (36.4 °C) (Oral)   Resp 18   Ht 5' 2\" (1.575 m)   Wt 174 lb 4.8 oz (79.1 kg)   SpO2 95%   BMI 31.88 kg/m²     General appearance: No apparent distress, appears stated age and cooperative. HEENT: Pupils equal, round, and reactive to light. Conjunctivae/corneas clear. Neck: Supple, with full range of motion. No jugular venous distention. Trachea midline. Respiratory:  Normal respiratory effort. Clear to auscultation, bilaterally without Rales/Wheezes/Rhonchi. Cardiovascular: Regular rate and rhythm with normal S1/S2 without murmurs, rubs or gallops. Abdomen: Soft, non-tender, non-distended with normal bowel sounds. Musculoskeletal: No clubbing, cyanosis or edema bilaterally.   Full range of motion without deformity. Skin: Skin color, texture, turgor normal.  No rashes or lesions. Neurologic:  Neurovascularly intact without any focal sensory/motor deficits. Cranial nerves: II-XII intact, grossly non-focal.  Psychiatric: Alert and oriented, thought content appropriate, normal insight  Capillary Refill: Brisk,< 3 seconds   Peripheral Pulses: +2 palpable, equal bilaterally       Labs:   Recent Labs     01/14/22  0450 01/14/22  1756 01/14/22  2148 01/15/22  0538 01/16/22  0602   WBC 5.9  --   --  6.1 5.3   HGB 11.2*   < > 10.9* 11.2* 10.9*   HCT 33.1*   < > 33.0* 33.2* 32.3*     --   --  251 281    < > = values in this interval not displayed. Recent Labs     01/14/22  0450 01/15/22  0538 01/16/22  0602   * 135* 134*   K 3.4* 4.1 3.7   CL 98* 101 100   CO2 23 26 24   BUN 17 15 15   CREATININE 0.8 0.8 0.8   CALCIUM 8.0* 7.8* 7.7*   PHOS  --  3.0 3.1     Recent Labs     01/13/22  1718 01/14/22  0450   AST 18 15   ALT 9* 8*   BILITOT 0.5 0.4   ALKPHOS 116 101     Recent Labs     01/14/22  1046   INR 1.51*     No results for input(s): CKTOTAL, TROPONINI in the last 72 hours. Urinalysis:    No results found for: Gema Lopez, BACTERIA, RBCUA, BLOODU, SPECGRAV, Any [address]    Radiology:  VL Extremity Venous Bilateral         CT ABDOMEN PELVIS W IV CONTRAST Additional Contrast? Oral   Final Result   Volume overload including large volume ascites and anasarca. Cirrhotic liver. No hepatic mass is visualized. Mild right hydronephrosis with distal ureteral tapering concerning for   ureteral stricture. RECOMMENDATIONS:   Unavailable         CT CHEST PULMONARY EMBOLISM W CONTRAST   Preliminary Result   Acute pulmonary emboli. No CT evidence of right heart strain. Moderate left and small right pleural effusions. Large amount of ascites. Probable cirrhosis. XR CHEST PORTABLE   Final Result   Small right pleural effusion with adjacent atelectasis. Mild pulmonary edema.  Infantium    (Results Pending)           Assessment/Plan:    Active Hospital Problems    Diagnosis     Ascites [R18.8]     Pulmonary hypertension ([address] [I27.20]     Acute hypoxemic respiratory failure (HCC) [J96.01]     DVT (deep vein thrombosis)  [O22.30]     Hypoxia [R09.02]      Acute pulmonary emboli  Has history of DVT  Likely related to liver disease  Started on Eliquis upon admission    Ascites  Liver cirrhosis on CT  GI consulted  Work-up in progress    Lower extremity edema  Likely related to liver disease and ascites  Bilateral venous Dopplers were ordered to rule out DVT    DVT Prophylaxis: Eliquis  Diet: ADULT DIET;  Regular; Low Fat/Low Chol/High Fiber/2 gm Na  Diet NPO  Code Status: Full Code    Electronically signed by Agata Luong MD on 1/16/2022 at 2:34 PM

## 2022-01-16 NOTE — PLAN OF CARE
Problem: Falls - Risk of:  Goal: Will remain free from falls  Description: Will remain free from falls  1/16/2022 0056 by Mallory Slade RN  Outcome: Ongoing  1/15/2022 1421 by Pio Collier RN  Outcome: Ongoing  Goal: Absence of physical injury  Description: Absence of physical injury  1/16/2022 0056 by Mallory Slade RN  Outcome: Ongoing  1/15/2022 1421 by Pio Collier RN  Outcome: Ongoing     Problem: ABCDS Injury Assessment  Goal: Absence of physical injury  1/16/2022 0056 by Mallory Slade RN  Outcome: Ongoing  1/15/2022 1421 by Pio Collier RN  Outcome: Ongoing

## 2022-01-17 ENCOUNTER — APPOINTMENT (OUTPATIENT)
Dept: ULTRASOUND IMAGING | Age: 82
DRG: 374 | End: 2022-01-17
Payer: MEDICARE

## 2022-01-17 ENCOUNTER — APPOINTMENT (OUTPATIENT)
Dept: INTERVENTIONAL RADIOLOGY/VASCULAR | Age: 82
DRG: 374 | End: 2022-01-17
Payer: MEDICARE

## 2022-01-17 LAB
ALBUMIN FLUID: 1.6 G/DL
ANION GAP SERPL CALCULATED.3IONS-SCNC: 9 MMOL/L (ref 3–16)
APPEARANCE FLUID: NORMAL
BASOPHILS ABSOLUTE: 0 K/UL (ref 0–0.2)
BASOPHILS RELATIVE PERCENT: 1 %
BUN BLDV-MCNC: 15 MG/DL (ref 7–20)
CALCIUM SERPL-MCNC: 7.6 MG/DL (ref 8.3–10.6)
CELL COUNT FLUID TYPE: NORMAL
CHLORIDE BLD-SCNC: 102 MMOL/L (ref 99–110)
CLOT EVALUATION: NORMAL
CO2: 24 MMOL/L (ref 21–32)
COLOR FLUID: YELLOW
CREAT SERPL-MCNC: 0.8 MG/DL (ref 0.6–1.2)
EOSINOPHILS ABSOLUTE: 0.2 K/UL (ref 0–0.6)
EOSINOPHILS RELATIVE PERCENT: 3.5 %
F-ACTIN AB IGG: 12 UNITS (ref 0–19)
FLUID PATH CONSULT: YES
FLUID TYPE: NORMAL
GFR AFRICAN AMERICAN: >60
GFR NON-AFRICAN AMERICAN: >60
GLUCOSE BLD-MCNC: 140 MG/DL (ref 70–99)
GLUCOSE BLD-MCNC: 144 MG/DL (ref 70–99)
GLUCOSE BLD-MCNC: 146 MG/DL (ref 70–99)
GLUCOSE BLD-MCNC: 176 MG/DL (ref 70–99)
HCT VFR BLD CALC: 31.3 % (ref 36–48)
HEMOGLOBIN: 10.6 G/DL (ref 12–16)
LYMPHOCYTES ABSOLUTE: 0.6 K/UL (ref 1–5.1)
LYMPHOCYTES RELATIVE PERCENT: 13.2 %
LYMPHOCYTES, BODY FLUID: 66 %
MACROPHAGE FLUID: 17 %
MAGNESIUM: 1.8 MG/DL (ref 1.8–2.4)
MCH RBC QN AUTO: 29.7 PG (ref 26–34)
MCHC RBC AUTO-ENTMCNC: 34 G/DL (ref 31–36)
MCV RBC AUTO: 87.4 FL (ref 80–100)
MESOTHELIAL FLUID: 10 %
MONOCYTES ABSOLUTE: 0.5 K/UL (ref 0–1.3)
MONOCYTES RELATIVE PERCENT: 10.4 %
MONONUCLEAR UNIDENTIFIED CELLS FLUID: 2 %
NEUTROPHIL, FLUID: 5 %
NEUTROPHILS ABSOLUTE: 3.5 K/UL (ref 1.7–7.7)
NEUTROPHILS RELATIVE PERCENT: 71.9 %
NUCLEATED CELLS FLUID: 509 /CUMM
NUMBER OF CELLS COUNTED FLUID: 100
PDW BLD-RTO: 15.1 % (ref 12.4–15.4)
PERFORMED ON: ABNORMAL
PHOSPHORUS: 3.3 MG/DL (ref 2.5–4.9)
PLATELET # BLD: 315 K/UL (ref 135–450)
PMV BLD AUTO: 7.3 FL (ref 5–10.5)
POTASSIUM SERPL-SCNC: 3.5 MMOL/L (ref 3.5–5.1)
PROTEIN FLUID: 2.5 G/DL
RBC # BLD: 3.58 M/UL (ref 4–5.2)
RBC FLUID: NORMAL /CUMM
SODIUM BLD-SCNC: 135 MMOL/L (ref 136–145)
WBC # BLD: 4.9 K/UL (ref 4–11)

## 2022-01-17 PROCEDURE — 87205 SMEAR GRAM STAIN: CPT

## 2022-01-17 PROCEDURE — 36573 INSJ PICC RS&I 5 YR+: CPT

## 2022-01-17 PROCEDURE — 2500000003 HC RX 250 WO HCPCS: Performed by: INTERNAL MEDICINE

## 2022-01-17 PROCEDURE — C1729 CATH, DRAINAGE: HCPCS

## 2022-01-17 PROCEDURE — 87070 CULTURE OTHR SPECIMN AEROBIC: CPT

## 2022-01-17 PROCEDURE — 83735 ASSAY OF MAGNESIUM: CPT

## 2022-01-17 PROCEDURE — 2580000003 HC RX 258: Performed by: HOSPITALIST

## 2022-01-17 PROCEDURE — 88342 IMHCHEM/IMCYTCHM 1ST ANTB: CPT

## 2022-01-17 PROCEDURE — 88305 TISSUE EXAM BY PATHOLOGIST: CPT

## 2022-01-17 PROCEDURE — 05HB33Z INSERTION OF INFUSION DEVICE INTO RIGHT BASILIC VEIN, PERCUTANEOUS APPROACH: ICD-10-PCS | Performed by: INTERNAL MEDICINE

## 2022-01-17 PROCEDURE — 88341 IMHCHEM/IMCYTCHM EA ADD ANTB: CPT

## 2022-01-17 PROCEDURE — 0W9G3ZZ DRAINAGE OF PERITONEAL CAVITY, PERCUTANEOUS APPROACH: ICD-10-PCS | Performed by: RADIOLOGY

## 2022-01-17 PROCEDURE — 6370000000 HC RX 637 (ALT 250 FOR IP): Performed by: PHYSICIAN ASSISTANT

## 2022-01-17 PROCEDURE — 6370000000 HC RX 637 (ALT 250 FOR IP): Performed by: HOSPITALIST

## 2022-01-17 PROCEDURE — 87015 SPECIMEN INFECT AGNT CONCNTJ: CPT

## 2022-01-17 PROCEDURE — C1751 CATH, INF, PER/CENT/MIDLINE: HCPCS

## 2022-01-17 PROCEDURE — 80048 BASIC METABOLIC PNL TOTAL CA: CPT

## 2022-01-17 PROCEDURE — 76705 ECHO EXAM OF ABDOMEN: CPT

## 2022-01-17 PROCEDURE — 85025 COMPLETE CBC W/AUTO DIFF WBC: CPT

## 2022-01-17 PROCEDURE — 49083 ABD PARACENTESIS W/IMAGING: CPT

## 2022-01-17 PROCEDURE — 84100 ASSAY OF PHOSPHORUS: CPT

## 2022-01-17 PROCEDURE — P9047 ALBUMIN (HUMAN), 25%, 50ML: HCPCS | Performed by: PHYSICIAN ASSISTANT

## 2022-01-17 PROCEDURE — 84157 ASSAY OF PROTEIN OTHER: CPT

## 2022-01-17 PROCEDURE — 93975 VASCULAR STUDY: CPT

## 2022-01-17 PROCEDURE — 82042 OTHER SOURCE ALBUMIN QUAN EA: CPT

## 2022-01-17 PROCEDURE — 2060000000 HC ICU INTERMEDIATE R&B

## 2022-01-17 PROCEDURE — 36415 COLL VENOUS BLD VENIPUNCTURE: CPT

## 2022-01-17 PROCEDURE — 6360000002 HC RX W HCPCS: Performed by: PHYSICIAN ASSISTANT

## 2022-01-17 PROCEDURE — 89051 BODY FLUID CELL COUNT: CPT

## 2022-01-17 PROCEDURE — 88112 CYTOPATH CELL ENHANCE TECH: CPT

## 2022-01-17 RX ORDER — FUROSEMIDE 40 MG/1
40 TABLET ORAL DAILY
Status: DISCONTINUED | OUTPATIENT
Start: 2022-01-18 | End: 2022-01-21 | Stop reason: HOSPADM

## 2022-01-17 RX ORDER — SODIUM CHLORIDE 9 MG/ML
25 INJECTION, SOLUTION INTRAVENOUS PRN
Status: DISCONTINUED | OUTPATIENT
Start: 2022-01-17 | End: 2022-01-17 | Stop reason: SDUPTHER

## 2022-01-17 RX ORDER — SODIUM CHLORIDE 0.9 % (FLUSH) 0.9 %
5-40 SYRINGE (ML) INJECTION PRN
Status: DISCONTINUED | OUTPATIENT
Start: 2022-01-17 | End: 2022-01-17 | Stop reason: SDUPTHER

## 2022-01-17 RX ORDER — SODIUM CHLORIDE 0.9 % (FLUSH) 0.9 %
5-40 SYRINGE (ML) INJECTION EVERY 12 HOURS SCHEDULED
Status: DISCONTINUED | OUTPATIENT
Start: 2022-01-17 | End: 2022-01-17 | Stop reason: SDUPTHER

## 2022-01-17 RX ORDER — SPIRONOLACTONE 25 MG/1
100 TABLET ORAL DAILY
Status: DISCONTINUED | OUTPATIENT
Start: 2022-01-18 | End: 2022-01-21 | Stop reason: HOSPADM

## 2022-01-17 RX ORDER — ALBUMIN (HUMAN) 12.5 G/50ML
50 SOLUTION INTRAVENOUS ONCE
Status: COMPLETED | OUTPATIENT
Start: 2022-01-17 | End: 2022-01-17

## 2022-01-17 RX ORDER — LIDOCAINE HYDROCHLORIDE 10 MG/ML
10 INJECTION, SOLUTION EPIDURAL; INFILTRATION; INTRACAUDAL; PERINEURAL ONCE
Status: COMPLETED | OUTPATIENT
Start: 2022-01-17 | End: 2022-01-17

## 2022-01-17 RX ORDER — ALBUMIN (HUMAN) 12.5 G/50ML
40 SOLUTION INTRAVENOUS ONCE
Status: DISCONTINUED | OUTPATIENT
Start: 2022-01-17 | End: 2022-01-17

## 2022-01-17 RX ORDER — LIDOCAINE HYDROCHLORIDE 10 MG/ML
5 INJECTION, SOLUTION EPIDURAL; INFILTRATION; INTRACAUDAL; PERINEURAL ONCE
Status: DISCONTINUED | OUTPATIENT
Start: 2022-01-17 | End: 2022-01-17 | Stop reason: SDUPTHER

## 2022-01-17 RX ORDER — LIDOCAINE HYDROCHLORIDE 10 MG/ML
5 INJECTION, SOLUTION EPIDURAL; INFILTRATION; INTRACAUDAL; PERINEURAL ONCE
Status: COMPLETED | OUTPATIENT
Start: 2022-01-17 | End: 2022-01-17

## 2022-01-17 RX ADMIN — LOSARTAN POTASSIUM 100 MG: 100 TABLET, FILM COATED ORAL at 15:36

## 2022-01-17 RX ADMIN — ATORVASTATIN CALCIUM 40 MG: 40 TABLET, FILM COATED ORAL at 15:42

## 2022-01-17 RX ADMIN — ALBUMIN (HUMAN) 50 G: 0.25 INJECTION, SOLUTION INTRAVENOUS at 16:37

## 2022-01-17 RX ADMIN — ALBUMIN (HUMAN) 50 G: 0.25 INJECTION, SOLUTION INTRAVENOUS at 20:31

## 2022-01-17 RX ADMIN — DILTIAZEM HYDROCHLORIDE 120 MG: 60 CAPSULE, EXTENDED RELEASE ORAL at 15:36

## 2022-01-17 RX ADMIN — HYDROCHLOROTHIAZIDE 12.5 MG: 25 TABLET ORAL at 15:36

## 2022-01-17 RX ADMIN — Medication 10 ML: at 20:27

## 2022-01-17 RX ADMIN — LIDOCAINE HYDROCHLORIDE 5 ML: 10 INJECTION, SOLUTION EPIDURAL; INFILTRATION; INTRACAUDAL; PERINEURAL at 20:35

## 2022-01-17 RX ADMIN — PANTOPRAZOLE SODIUM 40 MG: 40 TABLET, DELAYED RELEASE ORAL at 15:36

## 2022-01-17 RX ADMIN — LIDOCAINE HYDROCHLORIDE 10 ML: 10 INJECTION, SOLUTION EPIDURAL; INFILTRATION; INTRACAUDAL; PERINEURAL at 11:41

## 2022-01-17 ASSESSMENT — PAIN SCALES - GENERAL: PAINLEVEL_OUTOF10: 0

## 2022-01-17 NOTE — PROGRESS NOTES
Gastroenterology Progress Note      Bruce Valencia is a 80 y.o. female patient. 1. Acute pulmonary embolism without acute cor pulmonale, unspecified pulmonary embolism type ([facility]    2. Cirrhosis of liver with ascites, unspecified hepatic cirrhosis type ([facility] [address] [facility]    3. Pleural effusion, bilateral        SUBJECTIVE:  Pt has had some intermittent N/V here. Feels ok today. No abdominal pain. ROS:  Cardiovascular ROS: no chest pain or dyspnea on exertion  Gastrointestinal ROS: see hpi  Respiratory ROS: no cough, shortness of breath, or wheezing    Physical    VITALS:  /66   Pulse 70   Temp 98.6 °F (37 °C) (Oral)   Resp 18   Ht 5' 2\" (1.575 m)   Wt 174 lb 4.8 oz (79.1 kg)   SpO2 90%   BMI 31.88 kg/m²   TEMPERATURE:  Current - Temp: 98.6 °F (37 °C); Max - Temp  Av.1 °F (36.7 °C)  Min: 97.6 °F (36.4 °C)  Max: 99 °F (37.2 °C)    NAD  RRR, Nl s1s2  Lungs CTA Bilaterally, normal effort  Abdomen soft, ND, NT, no HSM, Bowel sounds normal  AAOx3, No asterixis     Data    Data Review:    Recent Labs     01/15/22  0538 22  0602 22  0422   WBC 6.1 5.3 4.9   HGB 11.2* 10.9* 10.6*   HCT 33.2* 32.3* 31.3*   MCV 88.1 88.1 87.4    281 315     Recent Labs     01/15/22  0538 22  0602 22  0423   * 134* 135*   K 4.1 3.7 3.5    100 102   CO2 26 24 24   PHOS 3.0 3.1 3.3   BUN 15 15 15   CREATININE 0.8 0.8 0.8     No results for input(s): AST, ALT, ALB, BILIDIR, BILITOT, ALKPHOS in the last 72 hours. No results for input(s): LIPASE, AMYLASE in the last 72 hours. Recent Labs     22  1046   PROTIME 17.3*   INR 1.51*     No results for input(s): PTT in the last 72 hours. Radiology Review:  CTabd/pelvis w PO and IV contrast 22  Impression   Volume overload including large volume ascites and anasarca.       Cirrhotic liver.  No hepatic mass is visualized.       Mild right hydronephrosis with distal ureteral tapering concerning for   ureteral stricture. ASSESSMENT     Cirrhosis -new diagnosis per CT as above. Prior CT last year with IV contrast with normal liver. Liver enzymes are normal.  Normal platelets. INR mildly elevated. No history of alcohol abuse. She has longstanding diabetes so could have Lemon cirrhosis. No GI bleeding. No encephalopathy. Negative: Hepatitis B and C,f-actin. Positive CLAUDIA but titer pending. Ferritin 600 but iron sat is normal at 15%. Ascites -noted on CT. Abdomen is soft. PE -hematology following. on therapeutic lovenox. Nausea and vomiting -intermittent over the past month. Takes a baby aspirin but no other NSAIDs. Right hydronephrosis - mild per CT. per primary team.        PLAN    - PPI  - npo midnight  - f/u AFP, A1AT, AMA, ceruloplasmin, CLAUDIA titer  -Pan EGD tomorrow for her history of nausea and vomiting as well as for variceal screening  - paracentesis with cell count for cell count and cx, albumin, protein, cytology  - doppler US hepatic vasculature  - start lasix 40 mg daily and aldactone 100 mg daily    Discussed with Dr. Richmond Bang, PASudhaC  [facility]    I have seen and examined this patient and agree with the assessment and plan as outlined by the nurse practitioner or physician assistant.

## 2022-01-17 NOTE — PROGRESS NOTES
6000ml of fluid removed  Spoke to patients WILMER ma and advised him the amount of fluid removed and that patient was returning to the floor.

## 2022-01-17 NOTE — PROGRESS NOTES
Hospitalist Progress Note      PCP: Taylor Longo MD    Date of Admission: 1/13/2022    Chief Complaint: Leg swelling and fatigue    Hospital Course: 72-year-old female presents with with bilateral lower extremity edema, abdominal distention, and shortness of breath lasting for several weeks. There is history of DVT. Subjective: Patient seen and examined bedside. Medications:  Reviewed    Infusion Medications    sodium chloride       Scheduled Medications    dilTIAZem  120 mg Oral Daily    sodium chloride flush  5-40 mL IntraVENous 2 times per day    hydroCHLOROthiazide  12.5 mg Oral Daily    And    losartan  100 mg Oral Daily    atorvastatin  40 mg Oral Daily    pantoprazole  40 mg Oral QAM AC    enoxaparin  1 mg/kg SubCUTAneous BID     PRN Meds: sodium chloride flush, sodium chloride, ondansetron **OR** ondansetron, polyethylene glycol, acetaminophen **OR** acetaminophen, aluminum & magnesium hydroxide-simethicone, perflutren lipid microspheres      Intake/Output Summary (Last 24 hours) at 1/17/2022 1044  Last data filed at 1/16/2022 2114  Gross per 24 hour   Intake 10 ml   Output    Net 10 ml       Physical Exam Performed:    /66   Pulse 70   Temp 98.6 °F (37 °C) (Oral)   Resp 18   Ht 5' 2\" (1.575 m)   Wt 174 lb 4.8 oz (79.1 kg)   SpO2 90%   BMI 31.88 kg/m²     General appearance: No apparent distress, appears stated age and cooperative. HEENT: Pupils equal, round, and reactive to light. Conjunctivae/corneas clear. Neck: Supple, with full range of motion. No jugular venous distention. Trachea midline. Respiratory:  Normal respiratory effort. Clear to auscultation, bilaterally without Rales/Wheezes/Rhonchi. Cardiovascular: Regular rate and rhythm with normal S1/S2 without murmurs, rubs or gallops. Abdomen: Soft, non-tender, non-distended with normal bowel sounds. Musculoskeletal: No clubbing, cyanosis or edema bilaterally.   Full range of motion without deformity. Skin: Skin color, texture, turgor normal.  No rashes or lesions. Neurologic:  Neurovascularly intact without any focal sensory/motor deficits. Cranial nerves: II-XII intact, grossly non-focal.  Psychiatric: Alert and oriented, thought content appropriate, normal insight  Capillary Refill: Brisk,< 3 seconds   Peripheral Pulses: +2 palpable, equal bilaterally       Labs:   Recent Labs     01/15/22  0538 01/16/22  0602 01/17/22  0422   WBC 6.1 5.3 4.9   HGB 11.2* 10.9* 10.6*   HCT 33.2* 32.3* 31.3*    281 315     Recent Labs     01/15/22  0538 01/16/22  0602 01/17/22  0423   * 134* 135*   K 4.1 3.7 3.5    100 102   CO2 26 24 24   BUN 15 15 15   CREATININE 0.8 0.8 0.8   CALCIUM 7.8* 7.7* 7.6*   PHOS 3.0 3.1 3.3     No results for input(s): AST, ALT, BILIDIR, BILITOT, ALKPHOS in the last 72 hours. Recent Labs     01/14/22  1046   INR 1.51*     No results for input(s): Nika Richard in the last 72 hours. Urinalysis:    No results found for: Dickinson Bridegroom, BACTERIA, RBCUA, BLOODU, Mirella Delay, Any [place] [address]    Radiology:  VL Extremity Venous Bilateral   Final Result      CT ABDOMEN PELVIS W IV CONTRAST Additional Contrast? Oral   Final Result   Volume overload including large volume ascites and anasarca. Cirrhotic liver. No hepatic mass is visualized. Mild right hydronephrosis with distal ureteral tapering concerning for   ureteral stricture. RECOMMENDATIONS:   Unavailable         CT CHEST PULMONARY EMBOLISM W CONTRAST   Preliminary Result   Acute pulmonary emboli. No CT evidence of right heart strain. Moderate left and small right pleural effusions. Large amount of ascites. Probable cirrhosis. XR CHEST PORTABLE   Final Result   Small right pleural effusion with adjacent atelectasis. Mild pulmonary edema.          IR US GUIDED PARACENTESIS    (Results Pending)           Assessment/Plan:    Active Hospital Problems    Diagnosis     Ascites [R18.8]  Pulmonary hypertension (HCC) [I27.20]     Acute hypoxemic respiratory failure (HCC) [J96.01]     DVT (deep vein thrombosis)  [O22.30]     Hypoxia [R09.02]      Acute pulmonary emboli  Has history of DVT  Likely related to liver disease  Started on Eliquis upon admission  Switched to lovenox with suspected gib and planned procedures    Ascites  Liver cirrhosis on CT  GI consulted  Work-up in progress  Paracentesis today    Lower extremity edema  Likely related to liver disease and ascites  Has bilateral DVT  On lovenox    DVT Prophylaxis: lovenox  Diet: Diet NPO  Code Status: Full Code    Electronically signed by Dawson Pena MD on 1/17/2022 at 10:44 AM

## 2022-01-17 NOTE — PLAN OF CARE
Shift assessment complete. VSS. Pt is alert and oriented x4. Evening medications administered per MAR. No signs of distress. No needs expressed at this time. Call light within reach. Bed alarm engaged. Pt encouraged to call for assistance.      Problem: Falls - Risk of:  Goal: Will remain free from falls  Description: Will remain free from falls  1/16/2022 2250 by Víctor Sutherland RN  Outcome: Ongoing     Problem: Falls - Risk of:  Goal: Absence of physical injury  Description: Absence of physical injury  1/16/2022 2250 by Víctor Sutherland RN  Outcome: Ongoing     Problem: ABCDS Injury Assessment  Goal: Absence of physical injury  1/16/2022 2250 by Víctor Sutherland RN  Outcome: Ongoing

## 2022-01-17 NOTE — PROGRESS NOTES
Physical Therapy  Yong S Mariola  Chart reviewed and attempted to see pt for treatment this afternoon however pt is DARRYL for ultrasound of liver. Will attempt to follow up with pt as schedule allows.    Isael Tenoroi, DPT [id number]

## 2022-01-17 NOTE — PROGRESS NOTES
Pt has been NPO since midnight and was reminded not to eat or drink anything for possible paracentesis today. Pt agrees with plan of care.

## 2022-01-18 ENCOUNTER — ANESTHESIA EVENT (OUTPATIENT)
Dept: ENDOSCOPY | Age: 82
DRG: 374 | End: 2022-01-18
Payer: MEDICARE

## 2022-01-18 ENCOUNTER — ANESTHESIA (OUTPATIENT)
Dept: ENDOSCOPY | Age: 82
DRG: 374 | End: 2022-01-18
Payer: MEDICARE

## 2022-01-18 VITALS
RESPIRATION RATE: 17 BRPM | OXYGEN SATURATION: 99 % | DIASTOLIC BLOOD PRESSURE: 80 MMHG | SYSTOLIC BLOOD PRESSURE: 127 MMHG

## 2022-01-18 PROBLEM — K74.60 CIRRHOSIS OF LIVER WITH ASCITES (HCC): Status: ACTIVE | Noted: 2022-01-18

## 2022-01-18 PROBLEM — R18.8 CIRRHOSIS OF LIVER WITH ASCITES (HCC): Status: ACTIVE | Noted: 2022-01-18

## 2022-01-18 PROBLEM — R11.0 NAUSEA IN ADULT: Status: ACTIVE | Noted: 2022-01-18

## 2022-01-18 LAB
ALBUMIN SERPL-MCNC: 2.4 G/DL (ref 3.4–5)
ALP BLD-CCNC: 80 U/L (ref 40–129)
ALT SERPL-CCNC: 6 U/L (ref 10–40)
ANION GAP SERPL CALCULATED.3IONS-SCNC: 8 MMOL/L (ref 3–16)
AST SERPL-CCNC: 12 U/L (ref 15–37)
BASOPHILS ABSOLUTE: 0 K/UL (ref 0–0.2)
BASOPHILS RELATIVE PERCENT: 0.9 %
BILIRUB SERPL-MCNC: 0.3 MG/DL (ref 0–1)
BILIRUBIN DIRECT: <0.2 MG/DL (ref 0–0.3)
BILIRUBIN, INDIRECT: ABNORMAL MG/DL (ref 0–1)
BUN BLDV-MCNC: 13 MG/DL (ref 7–20)
CALCIUM SERPL-MCNC: 7.8 MG/DL (ref 8.3–10.6)
CHLORIDE BLD-SCNC: 104 MMOL/L (ref 99–110)
CO2: 26 MMOL/L (ref 21–32)
CREAT SERPL-MCNC: 0.9 MG/DL (ref 0.6–1.2)
EOSINOPHILS ABSOLUTE: 0.2 K/UL (ref 0–0.6)
EOSINOPHILS RELATIVE PERCENT: 3.4 %
GFR AFRICAN AMERICAN: >60
GFR NON-AFRICAN AMERICAN: >60
GLUCOSE BLD-MCNC: 122 MG/DL (ref 70–99)
GLUCOSE BLD-MCNC: 141 MG/DL (ref 70–99)
GLUCOSE BLD-MCNC: 160 MG/DL (ref 70–99)
GLUCOSE BLD-MCNC: 166 MG/DL (ref 70–99)
GLUCOSE BLD-MCNC: 178 MG/DL (ref 70–99)
HCT VFR BLD CALC: 31.1 % (ref 36–48)
HEMOGLOBIN: 10.4 G/DL (ref 12–16)
LYMPHOCYTES ABSOLUTE: 0.5 K/UL (ref 1–5.1)
LYMPHOCYTES RELATIVE PERCENT: 10.2 %
MAGNESIUM: 1.8 MG/DL (ref 1.8–2.4)
MCH RBC QN AUTO: 29.5 PG (ref 26–34)
MCHC RBC AUTO-ENTMCNC: 33.5 G/DL (ref 31–36)
MCV RBC AUTO: 88 FL (ref 80–100)
MONOCYTES ABSOLUTE: 0.5 K/UL (ref 0–1.3)
MONOCYTES RELATIVE PERCENT: 12.2 %
NEUTROPHILS ABSOLUTE: 3.2 K/UL (ref 1.7–7.7)
NEUTROPHILS RELATIVE PERCENT: 73.3 %
PDW BLD-RTO: 15 % (ref 12.4–15.4)
PERFORMED ON: ABNORMAL
PHOSPHORUS: 3.5 MG/DL (ref 2.5–4.9)
PLATELET # BLD: 294 K/UL (ref 135–450)
PMV BLD AUTO: 7.2 FL (ref 5–10.5)
POTASSIUM SERPL-SCNC: 3.5 MMOL/L (ref 3.5–5.1)
RBC # BLD: 3.54 M/UL (ref 4–5.2)
SARS-COV-2, NAAT: NOT DETECTED
SODIUM BLD-SCNC: 138 MMOL/L (ref 136–145)
TOTAL PROTEIN: 3.8 G/DL (ref 6.4–8.2)
WBC # BLD: 4.4 K/UL (ref 4–11)

## 2022-01-18 PROCEDURE — 2060000000 HC ICU INTERMEDIATE R&B

## 2022-01-18 PROCEDURE — 6360000002 HC RX W HCPCS: Performed by: NURSE ANESTHETIST, CERTIFIED REGISTERED

## 2022-01-18 PROCEDURE — 3609012400 HC EGD TRANSORAL BIOPSY SINGLE/MULTIPLE: Performed by: INTERNAL MEDICINE

## 2022-01-18 PROCEDURE — 88341 IMHCHEM/IMCYTCHM EA ADD ANTB: CPT

## 2022-01-18 PROCEDURE — 7100000000 HC PACU RECOVERY - FIRST 15 MIN: Performed by: INTERNAL MEDICINE

## 2022-01-18 PROCEDURE — 88313 SPECIAL STAINS GROUP 2: CPT

## 2022-01-18 PROCEDURE — 88342 IMHCHEM/IMCYTCHM 1ST ANTB: CPT

## 2022-01-18 PROCEDURE — 2709999900 HC NON-CHARGEABLE SUPPLY: Performed by: INTERNAL MEDICINE

## 2022-01-18 PROCEDURE — 88305 TISSUE EXAM BY PATHOLOGIST: CPT

## 2022-01-18 PROCEDURE — 97116 GAIT TRAINING THERAPY: CPT

## 2022-01-18 PROCEDURE — 88381 MICRODISSECTION MANUAL: CPT

## 2022-01-18 PROCEDURE — 7100000001 HC PACU RECOVERY - ADDTL 15 MIN: Performed by: INTERNAL MEDICINE

## 2022-01-18 PROCEDURE — 84100 ASSAY OF PHOSPHORUS: CPT

## 2022-01-18 PROCEDURE — 0DB98ZX EXCISION OF DUODENUM, VIA NATURAL OR ARTIFICIAL OPENING ENDOSCOPIC, DIAGNOSTIC: ICD-10-PCS | Performed by: INTERNAL MEDICINE

## 2022-01-18 PROCEDURE — 83735 ASSAY OF MAGNESIUM: CPT

## 2022-01-18 PROCEDURE — 6360000002 HC RX W HCPCS: Performed by: PHYSICIAN ASSISTANT

## 2022-01-18 PROCEDURE — 88365 INSITU HYBRIDIZATION (FISH): CPT

## 2022-01-18 PROCEDURE — 85025 COMPLETE CBC W/AUTO DIFF WBC: CPT

## 2022-01-18 PROCEDURE — 0DB68ZX EXCISION OF STOMACH, VIA NATURAL OR ARTIFICIAL OPENING ENDOSCOPIC, DIAGNOSTIC: ICD-10-PCS | Performed by: INTERNAL MEDICINE

## 2022-01-18 PROCEDURE — 87635 SARS-COV-2 COVID-19 AMP PRB: CPT

## 2022-01-18 PROCEDURE — 80076 HEPATIC FUNCTION PANEL: CPT

## 2022-01-18 PROCEDURE — 6370000000 HC RX 637 (ALT 250 FOR IP): Performed by: PHYSICIAN ASSISTANT

## 2022-01-18 PROCEDURE — 2500000003 HC RX 250 WO HCPCS: Performed by: NURSE ANESTHETIST, CERTIFIED REGISTERED

## 2022-01-18 PROCEDURE — 3700000000 HC ANESTHESIA ATTENDED CARE: Performed by: INTERNAL MEDICINE

## 2022-01-18 PROCEDURE — 2580000003 HC RX 258: Performed by: HOSPITALIST

## 2022-01-18 PROCEDURE — 36415 COLL VENOUS BLD VENIPUNCTURE: CPT

## 2022-01-18 PROCEDURE — 88360 TUMOR IMMUNOHISTOCHEM/MANUAL: CPT

## 2022-01-18 PROCEDURE — 6370000000 HC RX 637 (ALT 250 FOR IP): Performed by: HOSPITALIST

## 2022-01-18 PROCEDURE — 81479 UNLISTED MOLECULAR PATHOLOGY: CPT

## 2022-01-18 PROCEDURE — 2580000003 HC RX 258: Performed by: ANESTHESIOLOGY

## 2022-01-18 PROCEDURE — 80048 BASIC METABOLIC PNL TOTAL CA: CPT

## 2022-01-18 PROCEDURE — 3700000001 HC ADD 15 MINUTES (ANESTHESIA): Performed by: INTERNAL MEDICINE

## 2022-01-18 RX ORDER — SODIUM CHLORIDE 9 MG/ML
INJECTION, SOLUTION INTRAVENOUS CONTINUOUS
Status: DISCONTINUED | OUTPATIENT
Start: 2022-01-18 | End: 2022-01-19

## 2022-01-18 RX ORDER — LIDOCAINE HYDROCHLORIDE 20 MG/ML
INJECTION, SOLUTION EPIDURAL; INFILTRATION; INTRACAUDAL; PERINEURAL PRN
Status: DISCONTINUED | OUTPATIENT
Start: 2022-01-18 | End: 2022-01-18 | Stop reason: SDUPTHER

## 2022-01-18 RX ORDER — PROPOFOL 10 MG/ML
INJECTION, EMULSION INTRAVENOUS PRN
Status: DISCONTINUED | OUTPATIENT
Start: 2022-01-18 | End: 2022-01-18 | Stop reason: SDUPTHER

## 2022-01-18 RX ADMIN — ATORVASTATIN CALCIUM 40 MG: 40 TABLET, FILM COATED ORAL at 11:45

## 2022-01-18 RX ADMIN — Medication 10 ML: at 09:00

## 2022-01-18 RX ADMIN — PROPOFOL 50 MG: 10 INJECTION, EMULSION INTRAVENOUS at 10:35

## 2022-01-18 RX ADMIN — DILTIAZEM HYDROCHLORIDE 120 MG: 60 CAPSULE, EXTENDED RELEASE ORAL at 11:45

## 2022-01-18 RX ADMIN — LIDOCAINE HYDROCHLORIDE 100 MG: 20 INJECTION, SOLUTION EPIDURAL; INFILTRATION; INTRACAUDAL; PERINEURAL at 10:32

## 2022-01-18 RX ADMIN — Medication 10 ML: at 20:45

## 2022-01-18 RX ADMIN — HYDROCHLOROTHIAZIDE 12.5 MG: 25 TABLET ORAL at 11:45

## 2022-01-18 RX ADMIN — SPIRONOLACTONE 100 MG: 25 TABLET ORAL at 11:45

## 2022-01-18 RX ADMIN — ENOXAPARIN SODIUM 80 MG: 100 INJECTION SUBCUTANEOUS at 20:45

## 2022-01-18 RX ADMIN — PROPOFOL 220 MG: 10 INJECTION, EMULSION INTRAVENOUS at 10:43

## 2022-01-18 RX ADMIN — SODIUM CHLORIDE: 9 INJECTION, SOLUTION INTRAVENOUS at 09:59

## 2022-01-18 RX ADMIN — LOSARTAN POTASSIUM 100 MG: 100 TABLET, FILM COATED ORAL at 11:45

## 2022-01-18 RX ADMIN — FUROSEMIDE 40 MG: 40 TABLET ORAL at 11:45

## 2022-01-18 ASSESSMENT — PULMONARY FUNCTION TESTS
PIF_VALUE: 0
PIF_VALUE: 1
PIF_VALUE: 1
PIF_VALUE: 0

## 2022-01-18 ASSESSMENT — PAIN SCALES - GENERAL
PAINLEVEL_OUTOF10: 0

## 2022-01-18 ASSESSMENT — ENCOUNTER SYMPTOMS: SHORTNESS OF BREATH: 0

## 2022-01-18 NOTE — H&P
Gastroenterology Note             Pre-operative History and Physical    Patient: Anahi Moffett  : 1940  CSN: [id number]    History Obtained From:  patient and/or guardian. HISTORY OF PRESENT ILLNESS:    The patient is a 80 y.o. female  here for nausea, new dx of cirrhosis- r/o varices. Past Medical History:    Past Medical History:   Diagnosis Date    Cancer ([facility] [address]     skin     Diabetes mellitus ([facility] [address]     DVT, lower extremity ([facility] [address]     Heart murmur     Hyperlipidemia     Hypertension     Pulmonary embolism ([facility] [address]      Past Surgical History:    Past Surgical History:   Procedure Laterality Date    APPENDECTOMY      COLONOSCOPY      CYSTOSCOPY  13    HYSTERECTOMY      OTHER SURGICAL HISTORY  13    Transvaginal taping and sling    PARTIAL NEPHRECTOMY Right 2016    robotic - Dr. Chadd Montero       Medications Prior to Admission:   No current facility-administered medications on file prior to encounter. Current Outpatient Medications on File Prior to Encounter   Medication Sig Dispense Refill    irbesartan-hydroCHLOROthiazide (AVALIDE) 300-12.5 MG per tablet Take 1 tablet by mouth daily      diltiazem (CARDIZEM SR) 120 MG SR capsule Take 120 mg by mouth daily      metFORMIN (GLUCOPHAGE) 500 MG tablet Take 500 mg by mouth 2 times daily (with meals)      atorvastatin (LIPITOR) 40 MG tablet Take 40 mg by mouth daily      aspirin 81 MG tablet Take 81 mg by mouth daily      dilTIAZem (CARDIZEM CD) 120 MG extended release capsule       metFORMIN (GLUCOPHAGE-XR) 500 MG extended release tablet       rivaroxaban (XARELTO) 15 MG TABS tablet Take 1 tablet by mouth 2 times daily (with meals) 42 tablet 0    sennosides-docusate sodium (SENOKOT-S) 8.6-50 MG tablet Take 1 tablet by mouth 2 times daily 50 tablet 0    Potassium Gluconate 595 MG CAPS Take  by mouth daily.  calcium carbonate (OSCAL) 500 MG TABS tablet Take 500 mg by mouth daily.  valsartan-hydrochlorothiazide (DIOVAN HCT) 160-12.5 MG per tablet Take 1 tablet by mouth daily. Allergies:  Amoxicillin-pot clavulanate      Social History:   Social History     Tobacco Use    Smoking status: Never Smoker    Smokeless tobacco: Never Used   Substance Use Topics    Alcohol use: No     Family History:   Family History   Problem Relation Age of Onset    Cancer Mother         stomach    Anesth Problems Neg Hx     Malig Hyperten Neg Hx     Hypotension Neg Hx     Malig Hypertherm Neg Hx     Pseudochol. Deficiency Neg Hx        PHYSICAL EXAM:      /67   Pulse 60   Temp 97.6 °F (36.4 °C) (Temporal)   Resp 18   Ht 5' 2\" (1.575 m)   Wt 174 lb 4.8 oz (79.1 kg)   SpO2 100%   BMI 31.88 kg/m²  I        Heart:   within normal limits    Lungs:  CTA bilat anteriorly,  Normal effort    Abdomen:   soft, NT, ND      ASA Grade:  ASA 2 - Patient with mild systemic disease with no functional limitations    Mallampati Class: 2      ASSESSMENT AND PLAN:    1. Patient is a 80 y.o. female here for EGD. 2.  Procedure options, risks and benefits reviewed with patient. Patient expresses understanding and wishes to proceed. Idalmis Whitten MD,   [facility]  1/18/2022    Please note that some or all of this record was generated using voice recognition software. If there are any questions about the content of this document, please contact the author as some errors in translation may have occurred.

## 2022-01-18 NOTE — CONSULTS
Nutrition Education    Provided cirrhosis diet education. Education included aiming for 4-6 small meals per day if appetite/early satiety are problematic, choosing high protein foods to prevent muscle loss, and keeping sodium intake to 2000 mg per day. All questions answered for pt. Handout provided.      Electronically signed by Hitesh Vallecillo RD, ABELARDO on 1/18/22 at 2:40 PM EST    Contact: [phone]

## 2022-01-18 NOTE — ANESTHESIA POSTPROCEDURE EVALUATION
Department of Anesthesiology  Postprocedure Note    Patient: Cecilio Hernandez  MRN: [id number]  YOB: 1940  Date of evaluation: 1/18/2022  Time:  11:14 AM     Procedure Summary     Date: 01/18/22 Room / Location: [address] [place] [address]    Anesthesia Start: 1033 Anesthesia Stop: 4509    Procedure: EGD BIOPSY (N/A ) Diagnosis: (.)    Surgeons: Fawn Singh MD Responsible Provider: Peace Urrutia MD    Anesthesia Type: MAC ASA Status: 3          Anesthesia Type: MAC    Jimi Phase I: Jimi Score: 8    Jimi Phase II:      Last vitals: Reviewed and per EMR flowsheets. Anesthesia Post Evaluation    Patient location during evaluation: PACU  Level of consciousness: awake  Airway patency: patent  Complications: no  Cardiovascular status: hemodynamically stable  Respiratory status: acceptable  There was medical reason for not using a multimodal analgesia pain management approach.

## 2022-01-18 NOTE — OP NOTE
Endoscopy Note    Patient: Reba Chin  : 1940  CSN: [id number]    Procedure: Esophagogastroduodenoscopy with biopsy    Date:  2022     Surgeon: Jazmyn Mcdonald MD     Referring Physician:  Drea Ibarra MD    Preoperative Diagnosis:  . Postoperative Diagnosis:  * No post-op diagnosis entered *    Anesthesia:  MAC    EBL: minimal to none. Indications: This is a 80y.o. year old female who presents today with Nausea, dyspepsia, new diagnosis of cirrhosis with ascitesrule out varices. Description of Procedure:  Informed consent was obtained from the patient after explanation of indications, benefits and possible risks and complications of the procedure. The patient was then taken to the endoscopy suite, placed in the left lateral decubitus position and the above IV sedation was administrered. The Olympus video endoscope was passed through the hypopharynx into the esophagus. The scope was advanced all the way to the second portion of the duodenum. The GE junction was at approximately 35 cms. The scope was slowly withdrawn and mucosa was carefully evaluated including a retroflex view of the proximal stomach. Findings and interventions are described below. The patient was decompressed and the scope was then withdrawn. Gastric or Duodenal ulcer present: No    The patient tolerated the procedure well and was taken to the post anesthesia care unit in good condition. There were no immediate complications. Impression:  -  #1 normal esophagus without evidence of esophageal varices. 2.  Moderately severe, diffuse gastritis versus portal hypertensive gastropathy. Some thickened folds with patchy erythema, edema and small erosions. Biopsies taken in the antrum and fundus of the stomach to rule out H. pylori using the [place] protocol. 3.  No gastric varices seen on retroflexion.   4.  Normal duodenum but biopsies taken in the bulb and second portion to rule out celiac disease. Recommendations: -Acid suppression with a proton pump inhibitor. , -Await pathology. , -Repeat upper endoscopy in 1 year. Avoid NSAIDs. Sangita Moran MD, MD  [address] [facility]  [phone]    Please note that some or all of this record was generated using voice recognition software. If there are any questions about the content of this document, please contact the author as some errors in translation may have occurred.

## 2022-01-18 NOTE — ANESTHESIA PRE PROCEDURE
Department of Anesthesiology  Preprocedure Note       Name:  Macel Heimlich   Age:  80 y.o.  :  1940                                          MRN:  [id number]         Date:  2022      Surgeon: Duane Ley):  Chan Gonzalez MD    Procedure: Procedure(s):  EGD DIAGNOSTIC ONLY    Medications prior to admission:   Prior to Admission medications    Medication Sig Start Date End Date Taking? Authorizing Provider   irbesartan-hydroCHLOROthiazide (AVALIDE) 300-12.5 MG per tablet Take 1 tablet by mouth daily 21  Yes Historical Provider, MD   diltiazem (CARDIZEM SR) 120 MG SR capsule Take 120 mg by mouth daily   Yes Historical Provider, MD   metFORMIN (GLUCOPHAGE) 500 MG tablet Take 500 mg by mouth 2 times daily (with meals)   Yes Historical Provider, MD   atorvastatin (LIPITOR) 40 MG tablet Take 40 mg by mouth daily   Yes Historical Provider, MD   aspirin 81 MG tablet Take 81 mg by mouth daily   Yes Historical Provider, MD   dilTIAZem (CARDIZEM CD) 120 MG extended release capsule  21   Historical Provider, MD   metFORMIN (GLUCOPHAGE-XR) 500 MG extended release tablet  21   Historical Provider, MD   rivaroxaban (XARELTO) 15 MG TABS tablet Take 1 tablet by mouth 2 times daily (with meals) 16   Desire Warner MD   sennosides-docusate sodium (SENOKOT-S) 8.6-50 MG tablet Take 1 tablet by mouth 2 times daily 7/15/16   Elis Centeno MD   Potassium Gluconate 595 MG CAPS Take  by mouth daily. Historical Provider, MD   calcium carbonate (OSCAL) 500 MG TABS tablet Take 500 mg by mouth daily. Historical Provider, MD   valsartan-hydrochlorothiazide (DIOVAN HCT) 160-12.5 MG per tablet Take 1 tablet by mouth daily.       Historical Provider, MD       Current medications:    Current Facility-Administered Medications   Medication Dose Route Frequency Provider Last Rate Last Admin    furosemide (LASIX) tablet 40 mg  40 mg Oral Daily Kalpesh Rizzo PA-C        spironolactone (ALDACTONE) tablet 100 mg  456 mg Oral Daily Raul Plascencia PA-C        enoxaparin (LOVENOX) injection 80 mg  1 mg/kg SubCUTAneous BID Katharina Gray PA-C        dilTIAZem (CARDIZEM 12 HR) extended release capsule 120 mg  120 mg Oral Daily Jhoan Pringle MD   120 mg at 01/17/22 1536    sodium chloride flush 0.9 % injection 5-40 mL  5-40 mL IntraVENous 2 times per day Nicci Brooks MD   10 mL at 01/18/22 0900    sodium chloride flush 0.9 % injection 5-40 mL  5-40 mL IntraVENous PRN Jhoan Pringle MD        0.9 % sodium chloride infusion  25 mL IntraVENous PRN Nicci Brooks MD        ondansetron (ZOFRAN-ODT) disintegrating tablet 4 mg  4 mg Oral Q8H PRN Jhoan Pringle MD        Or    ondansetron (ZOFRAN) injection 4 mg  4 mg IntraVENous Q6H PRN Jhoan Pringle MD        polyethylene glycol (GLYCOLAX) packet 17 g  17 g Oral Daily PRN Nicci Brooks MD        acetaminophen (TYLENOL) tablet 650 mg  650 mg Oral Q6H PRN Nicci Brooks MD        Or    acetaminophen (TYLENOL) suppository 650 mg  650 mg Rectal Q6H PRN Nicci Brooks MD        aluminum & magnesium hydroxide-simethicone (MAALOX) 200-200-20 MG/5ML suspension 30 mL  30 mL Oral Q6H PRN Jhoan Pringle MD        hydroCHLOROthiazide (HYDRODIURIL) tablet 12.5 mg  12.5 mg Oral Daily Jhoan Pringle MD   12.5 mg at 01/17/22 1536    And    losartan (COZAAR) tablet 100 mg  100 mg Oral Daily Jhoan Pringle MD   100 mg at 01/17/22 1536    atorvastatin (LIPITOR) tablet 40 mg  40 mg Oral Daily Jhoan Pringle MD   40 mg at 01/17/22 1542    pantoprazole (PROTONIX) tablet 40 mg  40 mg Oral QAM AC Jayashree JANIA Gray PA-C   40 mg at 01/17/22 1536    perflutren lipid microspheres (DEFINITY) injection 1.65 mg  1.5 mL IntraVENous ONCE PRN Rey Reed MD           Allergies:     Allergies   Allergen Reactions    Amoxicillin-Pot Clavulanate Hives       Problem List:    Patient Active Problem List   Diagnosis Code    01/18/2022       CMP:   Lab Results   Component Value Date     01/18/2022    K 3.5 01/18/2022    K 3.4 01/14/2022     01/18/2022    CO2 26 01/18/2022    BUN 13 01/18/2022    CREATININE 0.9 01/18/2022    GFRAA >60 01/18/2022    GFRAA >60 03/08/2012    AGRATIO 1.2 01/14/2022    LABGLOM >60 01/18/2022    GLUCOSE 141 01/18/2022    PROT 4.6 01/14/2022    CALCIUM 7.8 01/18/2022    BILITOT 0.4 01/14/2022    ALKPHOS 101 01/14/2022    AST 15 01/14/2022    ALT 8 01/14/2022       POC Tests:   Recent Labs     01/18/22  0806   POCGLU 160*       Coags:   Lab Results   Component Value Date    PROTIME 17.3 01/14/2022    INR 1.51 01/14/2022    APTT 28.2 06/29/2016       HCG (If Applicable): No results found for: PREGTESTUR, PREGSERUM, HCG, HCGQUANT     ABGs: No results found for: PHART, PO2ART, FYO1MKY, JZN5LXK, BEART, R5XWMLBO     Type & Screen (If Applicable):  No results found for: LABABO, LABRH    Drug/Infectious Status (If Applicable):  No results found for: HIV, HEPCAB    COVID-19 Screening (If Applicable):   Lab Results   Component Value Date    COVID19 Not Detected 01/18/2022           Anesthesia Evaluation  Patient summary reviewed and Nursing notes reviewed no history of anesthetic complications:   Airway: Mallampati: II  TM distance: >3 FB   Neck ROM: full  Mouth opening: > = 3 FB Dental: normal exam         Pulmonary:       (-) asthma and shortness of breath                           Cardiovascular:    (+) hypertension:, hyperlipidemia    (-)  angina                Neuro/Psych:      (-) CVA           GI/Hepatic/Renal:        (-) GERD and liver disease       Endo/Other:    (+) DiabetesType II DM, , malignancy/cancer. (-) hypothyroidism               Abdominal:             Vascular:   + DVT, PE.  - PVD. Other Findings:           Anesthesia Plan      MAC     ASA 3       Induction: intravenous. Anesthetic plan and risks discussed with patient. Plan discussed with CHINA Winslow Juan Miguel Marte MD   1/18/2022

## 2022-01-18 NOTE — PLAN OF CARE
Problem: Falls - Risk of:  Goal: Will remain free from falls  Description: Will remain free from falls  Outcome: Ongoing  Note: Patient remains absent from falls at this time. Remains alert and oriented, in bed with call light and belongings in reach. Non-slip footwear on and 2/4 siderails raised. Bed remains in lowest/locked position at all times with alarm activated. Fall precautions in place. Patient encouraged to use call light to request assistance, v/u.  Will continue to monitor.

## 2022-01-18 NOTE — PROGRESS NOTES
Pt stable and able to be transferred from PACU to room 3368. A&O , VSS, with no complaints at this time. 3T called and notified that pt is being transferred out of PACU and to room.

## 2022-01-18 NOTE — PROGRESS NOTES
Pt transferred to room 3368 at this time. A&O with no signs of distress. Tele on, with visual on monitor, HR of 70's. Report given to WILMER VALLE. V/u and denies questions or further needs at this time.

## 2022-01-18 NOTE — PROGRESS NOTES
MIDLINE:    Upon arrival to place peripheral IV. Pt did not have anything to venipuncture so midline order received and placed. Check chart for issues related to mid line placement, check for consent, and did time out with WILMER Murray. Pt. Tolerated placement well, no difficulty accessing right basilic vein and verified with blood return and flushed without resistance. Pt did not c/o of any numbness or tingling to extremity. RN educated on midline use and proper maintenance as well as patient. Reported off to WILMER Murray ok to use line.

## 2022-01-18 NOTE — PROGRESS NOTES
Physical Therapy  Facility/Department: [facility] ENDOSCOPY  Daily Treatment Note  NAME: Seda Tomas  : 1940  MRN: [id number]    Date of Service: 2022    Discharge Recommendations:  Seda Tomas scored a  on the AM-PAC short mobility form. Current research shows that an AM-PAC score of 18 or greater is typically associated with a discharge to the patient's home setting. Based on the patient's AM-PAC score and their current functional mobility deficits, it is recommended that the patient have 2-3 sessions per week of Physical Therapy at d/c to increase the patient's independence. At this time, this patient demonstrates the endurance and safety to discharge home with home services and a follow up treatment frequency of 2-3x/wk. Please see assessment section for further patient specific details. If patient discharges prior to next session this note will serve as a discharge summary. Please see below for the latest assessment towards goals. HOME HEALTH CARE: LEVEL 3 SAFETY     - Initial home health evaluation to occur within 24-48 hours, in patient home   - Therapy evaluations in home within 24-48 hours of discharge; including DME and home safety   - Frontload therapy 5 days, then 3x a week   - Therapy to evaluate if patient has [address] needs for personal care   -  evaluation within 24-48 hours, includes evaluation of resources and insurance to determine [place], [place], LTC, and Medicaid options     S Level 3   PT Equipment Recommendations  Equipment Needed: No    Assessment   Body structures, Functions, Activity limitations: Decreased functional mobility ; Decreased safe awareness;Decreased endurance;Decreased balance  Assessment: patient continues to present below baseline function requiring SBA for transfers and ambulation. Mildly unsteady gait noted.  Would contineu to benefit from skilled PT to address above deficits and faciltiate return to baseline function  Clinical Presentation: stable  PT Education: Goals;PT Role;Plan of Care  Patient Education: discharge recommendations discussed. Pt verbalized understanding  Barriers to Learning: [place]  REQUIRES PT FOLLOW UP: Yes  Activity Tolerance  Activity Tolerance: Patient Tolerated treatment well     Patient Diagnosis(es): The primary encounter diagnosis was Acute pulmonary embolism without acute cor pulmonale, unspecified pulmonary embolism type ([address] Diagnoses of Cirrhosis of liver with ascites, unspecified hepatic cirrhosis type ([address] and Pleural effusion, bilateral were also pertinent to this visit. has a past medical history of Cancer ([address] Diabetes mellitus ([address] DVT, lower extremity ([address] Heart murmur, Hyperlipidemia, Hypertension, and Pulmonary embolism ([address] has a past surgical history that includes Skin cancer excision; Hysterectomy; Colonoscopy; other surgical history (11/21/13); Cystocopy (11/21/13); Appendectomy; and partial nephrectomy (Right, 07/12/2016). Restrictions  Restrictions/Precautions  Restrictions/Precautions: Fall Risk (high fall risk)  Required Braces or Orthoses?: No  Position Activity Restriction  Other position/activity restrictions: Audie Chavez is a 80 y.o. female on Vickie Castillo MD service who was admitted on 1/13/2022 for fatigue, shortness of breath and and leg swelling. D-dimer was > 5250 on admission. CT pulmonary angiogram shows acute emboli within the lobar and segmental arteries of the right lung, no evidence of right heart strain. Cirrhotic liver and large amount of ascites was also noted. She denies history of liver disease. She has a history of DVT after hip surgery in 2008. She also had PE and DVT in 2016 after partial nephrectomy for renal cell carcinoma in 2016. She was treated with Xarelto. She did not require adjuvant therapy for her cancer. She denies recent surgery, travel and injury. She denies sedentary lifestyle.  She reports that her son and her granddaughter have a blood clotting disorder but does not know what mutation. Subjective   General  Chart Reviewed: Yes  Family / Caregiver Present: No  Subjective  Subjective: Denied pain. Agreeable to therapy. General Comment  Comments: Supine in bed upon arrival on 3L O2. Nursing states patient to be leaving at 9:30 for testing          Orientation  Orientation  Overall Orientation Status: Within Functional Limits  Cognition      Objective   Bed mobility  Supine to Sit: Minimal assistance (pulling up on therapist despite cues)  Sit to Supine: Supervision  Scooting: Supervision  Transfers  Sit to Stand: Stand by assistance  Stand to sit: Stand by assistance  Ambulation  Ambulation?: Yes  Ambulation 1  Surface: level tile  Device: No Device  Other Apparatus: O2 (3L o2)  Assistance: Stand by assistance  Quality of Gait: increased medial lateral sway, decreased tsering  Distance: 200'  Comments: Mildly SOB following ambulation. SpO2 87% on 3L initially, increased very quickly to 96% with seated rest break. Declined further ambulation due to pending testing. Balance  Sitting - Static: Good  Sitting - Dynamic: Good  Standing - Static: Good  Standing - Dynamic: Fair        AM-PAC Score  AM-PAC Inpatient Mobility Raw Score : 19 (01/18/22 1029)  AM-PAC Inpatient T-Scale Score : 45.44 (01/18/22 1029)  Mobility Inpatient CMS 0-100% Score: 41.77 (01/18/22 1029)  Mobility Inpatient CMS G-Code Modifier : CK (01/18/22 1029)          Goals  Short term goals  Time Frame for Short term goals: upon discharge  Short term goal 1: Pt will be able to complete bed mobility with indep  Short term goal 2: Pt will be able to complete transfers with indep  Short term goal 3: Pt will be able to ambulate 150 ft with LRAD and mod I  Short term goal 4: Pt will be able to ascend/descend 4 steps with SBA  No goals met this treatment.     Plan    Plan  Times per week: 3-5x  Times per day: Daily  Current Treatment Recommendations: Loren Jose

## 2022-01-18 NOTE — PROGRESS NOTES
Vitals:    01/17/22 2010   BP: 98/62   Pulse: 64   Resp: 16   Temp: 97.6 °F (36.4 °C)   SpO2: 90%     Midline to RUE placed. Infusing remaining amount of Albumin as ordered in AM.Marli well. No s/s of distress noted at this time, call light within reach, assessment complete.

## 2022-01-18 NOTE — PROGRESS NOTES
Hospitalist Progress Note      PCP: Shakir Mercer MD    Date of Admission: 1/13/2022    Chief Complaint: Leg swelling and fatigue    Hospital Course: This 70-year-old female presents with with bilateral lower extremity edema, abdominal distention, and shortness of breath lasting for several weeks. There is history of DVT. Interval history    Pt seen and examined today. Overnight events noted, interval ancillary notes and labs reviewed. Status post paracentesis  Plan for EGD today for nausea and vomiting as well as varices screening and Doppler US hepatic vasculature  On 3 L nasal cannula satting around 94%; wean as tolerated keep sats above 92  Afebrile overnight, WBCs within normal limits, tested negative for COVIDdenied cough, SOB, chest pain, nausea, vomiting or abdominal pain      Assessment and plan    Acute pulmonary emboli  Has history of DVT  Started on Eliquis upon admission; Switched to lovenox with suspected gib and planned procedures  Oncology on board: Appreciate the recs    Lower extremity edema; Likely related to liver disease and ascites  Has bilateral DVT  On lovenox     Cirrhosis: New diagnosis per CT  Previous CT last year with IV contrast showed normal liver.   No history of alcohol abuse  Normal LFTs, platelets and mildly elevated INR  GI on board: Appreciate their recs   Hepatitis B, C and F-actin negative  AFP, A1AT, AMA, ceruloplasmin, CLAUDIA titer ordered     Ascites: Liver cirrhosis on CT  Status post paracentesis on 1/17/2022; about 6 L of fluid removed  Fluid sent for cell count, albumin, protein, cytology and culture culture  Doppler ultrasound hepatic vasculature ordered  Started on Lasix and Aldactone    Nausea vomiting and abdominal pain:  Status post EGD on 1/18/22 which showed moderately severe, diffuse gastritis versus portal hypertensive gastropathy  Biopsy taken to rule out H. Pylori  No esophageal or gastric varices seen  Continue PPI, avoid NSAIDs  Repeat endoscopy in 1 year recommended    Mild right hydronephrosis per CT      DVT Prophylaxis: lovenox  Code Status: Full Code        Medications:  Reviewed    Infusion Medications    sodium chloride       Scheduled Medications    furosemide  40 mg Oral Daily    spironolactone  100 mg Oral Daily    enoxaparin  1 mg/kg SubCUTAneous BID    dilTIAZem  120 mg Oral Daily    sodium chloride flush  5-40 mL IntraVENous 2 times per day    hydroCHLOROthiazide  12.5 mg Oral Daily    And    losartan  100 mg Oral Daily    atorvastatin  40 mg Oral Daily    pantoprazole  40 mg Oral QAM AC     PRN Meds: sodium chloride flush, sodium chloride, ondansetron **OR** ondansetron, polyethylene glycol, acetaminophen **OR** acetaminophen, aluminum & magnesium hydroxide-simethicone, perflutren lipid microspheres      Intake/Output Summary (Last 24 hours) at 1/18/2022 0934  Last data filed at 1/18/2022 7754  Gross per 24 hour   Intake    Output 700 ml   Net -700 ml       Physical Exam Performed:    BP (!) 143/76   Pulse 64   Temp 97.8 °F (36.6 °C) (Oral)   Resp 18   Ht 5' 2\" (1.575 m)   Wt 174 lb 4.8 oz (79.1 kg)   SpO2 94%   BMI 31.88 kg/m²     General appearance: No apparent distress, appears stated age and cooperative. HEENT: Pupils equal, round, and reactive to light. Conjunctivae/corneas clear. Neck: Supple, with full range of motion. No jugular venous distention. Trachea midline. Respiratory:  Normal respiratory effort. Clear to auscultation, bilaterally without Rales/Wheezes/Rhonchi. Cardiovascular: Regular rate and rhythm with normal S1/S2 without murmurs, rubs or gallops. Abdomen: Soft, non-tender, non-distended with normal bowel sounds. Musculoskeletal: No clubbing, cyanosis or edema bilaterally. Full range of motion without deformity. Skin: Skin color, texture, turgor normal.  No rashes or lesions. Neurologic:  Neurovascularly intact without any focal sensory/motor deficits.  Cranial nerves: II-XII intact, grossly non-focal.  Psychiatric: Alert and oriented, thought content appropriate, normal insight  Capillary Refill: Brisk,< 3 seconds   Peripheral Pulses: +2 palpable, equal bilaterally       Labs:   Recent Labs     01/16/22  0602 01/17/22  0422 01/18/22  0440   WBC 5.3 4.9 4.4   HGB 10.9* 10.6* 10.4*   HCT 32.3* 31.3* 31.1*    315 294     Recent Labs     01/16/22  0602 01/17/22  0423 01/18/22  0440   * 135* 138   K 3.7 3.5 3.5    102 104   CO2 24 24 26   BUN 15 15 13   CREATININE 0.8 0.8 0.9   CALCIUM 7.7* 7.6* 7.8*   PHOS 3.1 3.3 3.5     No results for input(s): AST, ALT, BILIDIR, BILITOT, ALKPHOS in the last 72 hours. No results for input(s): INR in the last 72 hours. No results for input(s): Earnie Lent in the last 72 hours. Urinalysis:    No results found for: Jonda Deck, BACTERIA, RBCUA, BLOODU, [address], [address]    Radiology:  US DUP ABD PEL RETRO SCROT COMPLETE   Final Result   Liver cirrhosis. The main portal vein is patent. The hepatic veins are patent. US LIVER   Final Result   Liver cirrhosis. The main portal vein is patent. The hepatic veins are patent. IR US GUIDED PARACENTESIS   Final Result   Successful paracentesis. VL Extremity Venous Bilateral   Final Result      CT ABDOMEN PELVIS W IV CONTRAST Additional Contrast? Oral   Final Result   Volume overload including large volume ascites and anasarca. Cirrhotic liver. No hepatic mass is visualized. Mild right hydronephrosis with distal ureteral tapering concerning for   ureteral stricture. RECOMMENDATIONS:   Unavailable         CT CHEST PULMONARY EMBOLISM W CONTRAST   Preliminary Result   Acute pulmonary emboli. No CT evidence of right heart strain. Moderate left and small right pleural effusions. Large amount of ascites. Probable cirrhosis. XR CHEST PORTABLE   Final Result   Small right pleural effusion with adjacent atelectasis.   Mild pulmonary edema.                  Active Hospital Problems    Diagnosis     Ascites [R18.8]     Pulmonary hypertension ([address] [I27.20]     Acute hypoxemic respiratory failure (HCC) [J96.01]     DVT (deep vein thrombosis)  [O22.30]     Hypoxia [R09.02]        Electronically signed by Danielle Mccabe MD on 1/18/2022 at 9:34 AM

## 2022-01-19 LAB
ALPHA-1 ANTITRYPSIN PHENOTYPE: ABNORMAL
ALPHA-1 ANTITRYPSIN: 231 MG/DL (ref 90–200)
ANION GAP SERPL CALCULATED.3IONS-SCNC: 8 MMOL/L (ref 3–16)
ANTINUCLEAR AB INTERPRETIVE COMMENT: NORMAL
ANTINUCLEAR ANTIBODY, HEP-2, IGG: NORMAL
BASOPHILS ABSOLUTE: 0 K/UL (ref 0–0.2)
BASOPHILS RELATIVE PERCENT: 0.8 %
BUN BLDV-MCNC: 11 MG/DL (ref 7–20)
CALCIUM SERPL-MCNC: 7.9 MG/DL (ref 8.3–10.6)
CHLORIDE BLD-SCNC: 101 MMOL/L (ref 99–110)
CO2: 25 MMOL/L (ref 21–32)
CREAT SERPL-MCNC: 0.9 MG/DL (ref 0.6–1.2)
EOSINOPHILS ABSOLUTE: 0.2 K/UL (ref 0–0.6)
EOSINOPHILS RELATIVE PERCENT: 4.1 %
GFR AFRICAN AMERICAN: >60
GFR NON-AFRICAN AMERICAN: >60
GLUCOSE BLD-MCNC: 134 MG/DL (ref 70–99)
GLUCOSE BLD-MCNC: 138 MG/DL (ref 70–99)
GLUCOSE BLD-MCNC: 139 MG/DL (ref 70–99)
GLUCOSE BLD-MCNC: 145 MG/DL (ref 70–99)
GLUCOSE BLD-MCNC: 165 MG/DL (ref 70–99)
HCT VFR BLD CALC: 31.1 % (ref 36–48)
HEMOGLOBIN: 10.6 G/DL (ref 12–16)
LYMPHOCYTES ABSOLUTE: 0.4 K/UL (ref 1–5.1)
LYMPHOCYTES RELATIVE PERCENT: 11.4 %
MAGNESIUM: 1.7 MG/DL (ref 1.8–2.4)
MCH RBC QN AUTO: 30 PG (ref 26–34)
MCHC RBC AUTO-ENTMCNC: 33.9 G/DL (ref 31–36)
MCV RBC AUTO: 88.3 FL (ref 80–100)
MONOCYTES ABSOLUTE: 0.4 K/UL (ref 0–1.3)
MONOCYTES RELATIVE PERCENT: 10.2 %
NEUTROPHILS ABSOLUTE: 2.9 K/UL (ref 1.7–7.7)
NEUTROPHILS RELATIVE PERCENT: 73.5 %
PDW BLD-RTO: 15.2 % (ref 12.4–15.4)
PERFORMED ON: ABNORMAL
PHOSPHORUS: 3.7 MG/DL (ref 2.5–4.9)
PLATELET # BLD: 259 K/UL (ref 135–450)
PMV BLD AUTO: 7.2 FL (ref 5–10.5)
POTASSIUM SERPL-SCNC: 3.7 MMOL/L (ref 3.5–5.1)
PROCALCITONIN: 0.16 NG/ML (ref 0–0.15)
RBC # BLD: 3.53 M/UL (ref 4–5.2)
SODIUM BLD-SCNC: 134 MMOL/L (ref 136–145)
WBC # BLD: 3.9 K/UL (ref 4–11)

## 2022-01-19 PROCEDURE — 6370000000 HC RX 637 (ALT 250 FOR IP): Performed by: PHYSICIAN ASSISTANT

## 2022-01-19 PROCEDURE — 6360000002 HC RX W HCPCS: Performed by: PHYSICIAN ASSISTANT

## 2022-01-19 PROCEDURE — 2580000003 HC RX 258: Performed by: HOSPITALIST

## 2022-01-19 PROCEDURE — 2060000000 HC ICU INTERMEDIATE R&B

## 2022-01-19 PROCEDURE — 84100 ASSAY OF PHOSPHORUS: CPT

## 2022-01-19 PROCEDURE — 2700000000 HC OXYGEN THERAPY PER DAY

## 2022-01-19 PROCEDURE — 97116 GAIT TRAINING THERAPY: CPT

## 2022-01-19 PROCEDURE — 6370000000 HC RX 637 (ALT 250 FOR IP): Performed by: HOSPITALIST

## 2022-01-19 PROCEDURE — 82105 ALPHA-FETOPROTEIN SERUM: CPT

## 2022-01-19 PROCEDURE — 84145 PROCALCITONIN (PCT): CPT

## 2022-01-19 PROCEDURE — 36415 COLL VENOUS BLD VENIPUNCTURE: CPT

## 2022-01-19 PROCEDURE — 86301 IMMUNOASSAY TUMOR CA 19-9: CPT

## 2022-01-19 PROCEDURE — 85025 COMPLETE CBC W/AUTO DIFF WBC: CPT

## 2022-01-19 PROCEDURE — 83735 ASSAY OF MAGNESIUM: CPT

## 2022-01-19 PROCEDURE — 86480 TB TEST CELL IMMUN MEASURE: CPT

## 2022-01-19 PROCEDURE — 80048 BASIC METABOLIC PNL TOTAL CA: CPT

## 2022-01-19 PROCEDURE — 83036 HEMOGLOBIN GLYCOSYLATED A1C: CPT

## 2022-01-19 PROCEDURE — 6360000002 HC RX W HCPCS: Performed by: INTERNAL MEDICINE

## 2022-01-19 PROCEDURE — 97530 THERAPEUTIC ACTIVITIES: CPT

## 2022-01-19 RX ORDER — INSULIN LISPRO 100 [IU]/ML
0-6 INJECTION, SOLUTION INTRAVENOUS; SUBCUTANEOUS
Status: DISCONTINUED | OUTPATIENT
Start: 2022-01-19 | End: 2022-01-21 | Stop reason: HOSPADM

## 2022-01-19 RX ORDER — MAGNESIUM SULFATE IN WATER 40 MG/ML
2000 INJECTION, SOLUTION INTRAVENOUS ONCE
Status: COMPLETED | OUTPATIENT
Start: 2022-01-19 | End: 2022-01-19

## 2022-01-19 RX ORDER — NICOTINE POLACRILEX 4 MG
15 LOZENGE BUCCAL PRN
Status: DISCONTINUED | OUTPATIENT
Start: 2022-01-19 | End: 2022-01-21 | Stop reason: HOSPADM

## 2022-01-19 RX ORDER — INSULIN LISPRO 100 [IU]/ML
0-3 INJECTION, SOLUTION INTRAVENOUS; SUBCUTANEOUS NIGHTLY
Status: DISCONTINUED | OUTPATIENT
Start: 2022-01-19 | End: 2022-01-21 | Stop reason: HOSPADM

## 2022-01-19 RX ORDER — DEXTROSE MONOHYDRATE 25 G/50ML
12.5 INJECTION, SOLUTION INTRAVENOUS PRN
Status: DISCONTINUED | OUTPATIENT
Start: 2022-01-19 | End: 2022-01-21 | Stop reason: HOSPADM

## 2022-01-19 RX ORDER — DEXTROSE MONOHYDRATE 50 MG/ML
100 INJECTION, SOLUTION INTRAVENOUS PRN
Status: DISCONTINUED | OUTPATIENT
Start: 2022-01-19 | End: 2022-01-21 | Stop reason: HOSPADM

## 2022-01-19 RX ADMIN — MAGNESIUM SULFATE HEPTAHYDRATE 2000 MG: 40 INJECTION, SOLUTION INTRAVENOUS at 10:04

## 2022-01-19 RX ADMIN — SPIRONOLACTONE 100 MG: 25 TABLET ORAL at 12:24

## 2022-01-19 RX ADMIN — HYDROCHLOROTHIAZIDE 12.5 MG: 25 TABLET ORAL at 08:30

## 2022-01-19 RX ADMIN — DILTIAZEM HYDROCHLORIDE 120 MG: 60 CAPSULE, EXTENDED RELEASE ORAL at 08:30

## 2022-01-19 RX ADMIN — ATORVASTATIN CALCIUM 40 MG: 40 TABLET, FILM COATED ORAL at 08:30

## 2022-01-19 RX ADMIN — PANTOPRAZOLE SODIUM 40 MG: 40 TABLET, DELAYED RELEASE ORAL at 08:30

## 2022-01-19 RX ADMIN — ENOXAPARIN SODIUM 80 MG: 100 INJECTION SUBCUTANEOUS at 08:30

## 2022-01-19 RX ADMIN — FUROSEMIDE 40 MG: 40 TABLET ORAL at 08:30

## 2022-01-19 RX ADMIN — Medication 10 ML: at 20:47

## 2022-01-19 RX ADMIN — Medication 10 ML: at 08:33

## 2022-01-19 RX ADMIN — ENOXAPARIN SODIUM 80 MG: 100 INJECTION SUBCUTANEOUS at 20:47

## 2022-01-19 ASSESSMENT — PAIN SCALES - GENERAL
PAINLEVEL_OUTOF10: 0

## 2022-01-19 NOTE — PROGRESS NOTES
Physical Therapy  Facility/Department: [address]  Daily Treatment Note  NAME: Anahi Moffett  : 1940  MRN: [id number]    Date of Service: 2022    Discharge Recommendations:  Anahi Moffett scored a 19/24 on the AM-PAC short mobility form. Current research shows that an AM-PAC score of 18 or greater is typically associated with a discharge to the patient's home setting. Based on the patient's AM-PAC score and their current functional mobility deficits, it is recommended that the patient have 2-3 sessions per week of Physical Therapy at d/c to increase the patient's independence. At this time, this patient demonstrates the endurance and safety to discharge home with home services) and a follow up treatment frequency of 2-3x/wk. Please see assessment section for further patient specific details. HOME HEALTH CARE: LEVEL 3 SAFETY     - Initial home health evaluation to occur within 24-48 hours, in patient home   - Therapy evaluations in home within 24-48 hours of discharge; including DME and home safety   - Frontload therapy 5 days, then 3x a week   - Therapy to evaluate if patient has [address] needs for personal care   -  evaluation within 24-48 hours, includes evaluation of resources and insurance to determine AL, [place], LTC, and Medicaid options     If patient discharges prior to next session this note will serve as a discharge summary. Please see below for the latest assessment towards goals. S Level 3   PT Equipment Recommendations  Equipment Needed: Yes  Mobility Devices: Donna Cristina: Rolling  Other: recommend RW for safe ambulation upon discharge    Assessment   Body structures, Functions, Activity limitations: Decreased functional mobility ; Decreased safe awareness;Decreased endurance;Decreased balance  Assessment: Patient with slight decline in function this date compared to previous session.  Unsteady gait this date with need for walker for safe ambulation and increased SOB with activity with increased need for seated rest breaks this date. Continue to recommend skilled PT to address above deficits and facilitate return to baseline function. At this time, recommend use of RW for safe ambulation. Treatment Diagnosis: decreased balance, decreased endurance  Prognosis: Good  Clinical Presentation: stable  PT Education: Goals;PT Role;Plan of Care;General Safety  Patient Education: discharge recommendations discussed, use of RW for safe ambulation. Pt verbalized understanding  Barriers to Learning: [place]  REQUIRES PT FOLLOW UP: Yes  Activity Tolerance  Activity Tolerance: Patient limited by endurance  Activity Tolerance: increased rest breaks needed this date     Patient Diagnosis(es): The primary encounter diagnosis was Acute pulmonary embolism without acute cor pulmonale, unspecified pulmonary embolism type ([address] Diagnoses of Cirrhosis of liver with ascites, unspecified hepatic cirrhosis type ([address] and Pleural effusion, bilateral were also pertinent to this visit. has a past medical history of Cancer ([address] Diabetes mellitus ([address] DVT, lower extremity ([address] Heart murmur, Hyperlipidemia, Hypertension, and Pulmonary embolism ([address] has a past surgical history that includes Skin cancer excision; Hysterectomy; Colonoscopy; other surgical history (11/21/13); Cystocopy (11/21/13); Appendectomy; partial nephrectomy (Right, 07/12/2016); and Upper gastrointestinal endoscopy (N/A, 1/18/2022). Restrictions  Restrictions/Precautions  Restrictions/Precautions: Fall Risk (high fall risk)  Required Braces or Orthoses?: No  Position Activity Restriction  Other position/activity restrictions: Shanice Graham is a 80 y.o. female on Luisa Mercado MD service who was admitted on 1/13/2022 for fatigue, shortness of breath and and leg swelling. D-dimer was > 5250 on admission.  CT pulmonary angiogram shows acute emboli within the lobar and segmental arteries of the right lung, no evidence of right heart strain. Cirrhotic liver and large amount of ascites was also noted. She denies history of liver disease. She has a history of DVT after hip surgery in 2008. She also had PE and DVT in 2016 after partial nephrectomy for renal cell carcinoma in 2016. She was treated with Xarelto. She did not require adjuvant therapy for her cancer. She denies recent surgery, travel and injury. She denies sedentary lifestyle. She reports that her son and her granddaughter have a blood clotting disorder but does not know what mutation. Subjective   General  Chart Reviewed: Yes  Family / Caregiver Present: No  Subjective  Subjective: Denied pain. Agreeable to therapy. General Comment  Comments: supine in bed upon arrival on 3L O2. Pain Screening  Patient Currently in Pain: Denies  Vital Signs  Patient Currently in Pain: Denies       Orientation  Orientation  Overall Orientation Status: Within Functional Limits  Cognition      Objective   Bed mobility  Supine to Sit: Stand by assistance (HOB elevated)  Scooting: Stand by assistance  Transfers  Sit to Stand: Stand by assistance (from bed, chair x 3, BSC x 2)  Stand to sit: Stand by assistance  Ambulation 1  Surface: level tile  Device: No Device  Other Apparatus: O2 (3L O2)  Assistance: Contact guard assistance  Quality of Gait: increased medial lateral sway, decreased tsering, unsteady gait, reaching for environmental surfaces for support  Distance: 15 + 15 + 25 feet  Comments: SOB with ambulation. SpO2 93% on 3L O2. Provided with RW due to unsteady gait this date  Ambulation 2  Surface - 2: level tile  Device 2: Rolling Walker  Assistance 2: Stand by assistance  Quality of Gait 2: decreased tsering, steady gait  Distance: 100' x 2  Comments: seated rest break needed due to SOB and fatigue.  Spo2 95% on 3L O2 following ambulation despite SOB     Balance  Sitting - Static: Good  Sitting - Dynamic: Good  Standing - Static: Good (able to stand x 2-3 minutes at sink engaged in UE task, but needed UE support at all times)  Standing - Dynamic: Fair  Comments: completed hair washing task in seated position. able to perform trunk extension and return to midline x 5-6 to accomplish task. AM-PAC Score  AM-PAC Inpatient Mobility Raw Score : 19 (01/19/22 1006)  AM-PAC Inpatient T-Scale Score : 45.44 (01/19/22 1006)  Mobility Inpatient CMS 0-100% Score: 41.77 (01/19/22 1006)  Mobility Inpatient CMS G-Code Modifier : CK (01/19/22 1006)          Goals  Short term goals  Time Frame for Short term goals: upon discharge  Short term goal 1: Pt will be able to complete bed mobility with indep  Short term goal 2: Pt will be able to complete transfers with indep  Short term goal 3: Pt will be able to ambulate 150 ft with LRAD and mod I  Short term goal 4: Pt will be able to ascend/descend 4 steps with SBA  No goals met this treatment. Plan    Plan  Times per week: 3-5x  Times per day: Daily  Current Treatment Recommendations: Strengthening,Balance Training,Functional Mobility Training,Transfer Training,Endurance Training,Gait Training,Stair training,Home Exercise Program,Safety Education & Training  Safety Devices  Type of devices:  All fall risk precautions in place,Call light within reach,Chair alarm in place,Nurse notified,Patient at risk for falls,Left in chair  Restraints  Initially in place: No     Therapy Time   Individual Concurrent Group Co-treatment   Time In 0900         Time Out 0954         Minutes 54         Timed Code Treatment Minutes: [address] [facility], PT    Thanks, Adrian Matias, PT, DPT [id number]

## 2022-01-19 NOTE — PROGRESS NOTES
Nutrition Assessment     Type and Reason for Visit: Initial (LOS assessment)    Nutrition Recommendations/Plan:   No nutrition rec's @ this time. Nutrition Assessment:  Nutrition status is stable AEB po intake at least 50% of most meals. Pt reports appetite is good but sometimes hospital food is inedible. Denies need for ONS & expressees no nutrition concerns @ this time. Pt is at low risk for nutrition compromise. Malnutrition Assessment:  Malnutrition Status: No malnutrition    Nutrition Related Findings: +2 pitting edema BLE; LBM 1/17. Na 134      Current Nutrition Therapies:    ADULT DIET; Regular; 4 carb choices (60 gm/meal);  Low Sodium (2 gm)    Anthropometric Measures:  · Height: 5' 2\" (157.5 cm)  · Current Body Wt: 157 lb (71.2 kg)   · BMI: 28.7    Nutrition Diagnosis:   No nutrition diagnosis at this time     Nutrition Interventions:   Food and/or Nutrient Delivery:  Continue Current Diet  Nutrition Education/Counseling:  Education completed   Coordination of Nutrition Care:  Continue to monitor while inpatient    Goals:  po intake at least 50% of meals       Nutrition Monitoring and Evaluation:   Behavioral-Environmental Outcomes:  None Identified   Food/Nutrient Intake Outcomes:  Food and Nutrient Intake  Physical Signs/Symptoms Outcomes:  None Identified     Discharge Planning:    Continue current diet     Electronically signed by Richard Bellamy RD, LD on 1/19/22 at 11:30 AM EST    Contact: [phone]

## 2022-01-19 NOTE — PROGRESS NOTES
Gastroenterology Progress Note      Es Decker is a 80 y.o. female patient. 1. Acute pulmonary embolism without acute cor pulmonale, unspecified pulmonary embolism type ([facility] [facility]    2. Cirrhosis of liver with ascites, unspecified hepatic cirrhosis type ([facility] [address]    3. Pleural effusion, bilateral        SUBJECTIVE:  Feels good. Tolerating PO. No N/V. No abdominal pain. ROS:  Cardiovascular ROS: no chest pain or dyspnea on exertion  Gastrointestinal ROS: see hpi  Respiratory ROS: no cough, shortness of breath, or wheezing    Physical    VITALS:  /70   Pulse 64   Temp 98.2 °F (36.8 °C) (Oral)   Resp 15   Ht 5' 2\" (1.575 m)   Wt 157 lb 14.4 oz (71.6 kg)   SpO2 94%   BMI 28.88 kg/m²   TEMPERATURE:  Current - Temp: 98.2 °F (36.8 °C); Max - Temp  Av.7 °F (36.5 °C)  Min: 97.3 °F (36.3 °C)  Max: 98.2 °F (36.8 °C)    NAD  RRR, Nl s1s2  Lungs CTA Bilaterally, normal effort  Abdomen soft, ND, NT, no HSM, Bowel sounds normal  AAOx3, No asterixis     Data    Data Review:    Recent Labs     22  04222  0440 22  0538   WBC 4.9 4.4 3.9*   HGB 10.6* 10.4* 10.6*   HCT 31.3* 31.1* 31.1*   MCV 87.4 88.0 88.3    294 259     Recent Labs     22  0423 22  0440 22  0538   * 138 134*   K 3.5 3.5 3.7    104 101   CO2 24 26 25   PHOS 3.3 3.5 3.7   BUN 15 13 11   CREATININE 0.8 0.9 0.9     Recent Labs     22  0439   AST 12*   ALT 6*   BILIDIR <0.2   BILITOT 0.3   ALKPHOS 80     No results for input(s): LIPASE, AMYLASE in the last 72 hours. No results for input(s): PROTIME, INR in the last 72 hours. No results for input(s): PTT in the last 72 hours. Radiology Review:  CTabd/pelvis w PO and IV contrast 22  Impression   Volume overload including large volume ascites and anasarca.       Cirrhotic liver.  No hepatic mass is visualized.       Mild right hydronephrosis with distal ureteral tapering concerning for   ureteral stricture.      ECHO 1/15/22  Conclusions      Summary   LV systolic function is hyperdynamic wit EF visually estimated to be 65-70%. No regional wall motion abnormalities are noted. LV cavity size appears small with mid-cavity obliteration. There is a late peaking gradient of 26mmHg at rest which increases to 65mmHg   with Valsalva. No obvious systolic anterior motion of mitral valve. There   appears to be chordal SUZETTE.   Indeterminate diastolic function. Aortic valve appears sclerotic but opens adequately. Mild aortic regurgitation   Mild tricuspid regurgitation with a PASP of 47mmHg c/w mild pulm HTN. Possible pleural effusion. ASSESSMENT     Cirrhosis -new diagnosis per CT as above. Prior CT last year with IV contrast with normal liver. Liver enzymes are normal.  Normal platelets. INR mildly elevated. No history of alcohol abuse. She has longstanding diabetes so could have Lemon cirrhosis. No esophageal or gastric varices on EGD 1/18/22. No encephalopathy. Negative: Hepatitis B and C, f-actin, A1AT phenotype. Positive CLAUDIA but < 1:80. Ferritin 600 but iron sat is normal at 15%. Doppler of hepatic vasculature was ok. Ascites - s/p para 1/17 with 6 L fluid removed. Neg for neutrocytic ascites. Protein was high which can be seen with cardiac ascites. But SAAG is low at 0.8 so this is NOT consistent with portal hypertension as would expect if ascites is due to cirrhosis. Await cytology. Will check other tumor markers. PE -hematology has eval patient. on therapeutic lovenox. Nausea and vomiting -intermittent over the past month. resolved after para. EGD 1/18 with moderately severe, diffuse gastritis vs portal hypertensive gastropathy.    Right hydronephrosis - mild per CT. per primary team.        PLAN    - PPI  - f/u path from EGD   - f/u AFP, A1AT, AMA, ceruloplasmin   - f/u ascitic fluid cx and cytology  - lasix 40 mg daily and aldactone 100 mg daily  - check CEA, Ca 19-9, Ca 125 given ascitic fluid low SAAG. Will notify onc as well. - consider liver bx as outpatient to confirm cirrhosis and eval for cause  - pt feels well and wants to go home. The remaining work up can be done outpatient. Discussed with Dr. Luis Tapia, GUS  [facility]    I have seen and examined this patient and agree with the assessment and plan as outlined by the nurse practitioner or physician assistant.

## 2022-01-19 NOTE — PLAN OF CARE
Problem: Falls - Risk of:  Goal: Will remain free from falls  Description: Will remain free from falls  1/19/2022 0859 by Jennifer Rubin RN  Outcome: Ongoing     Problem: Falls - Risk of:  Goal: Absence of physical injury  Description: Absence of physical injury  1/19/2022 0859 by Jennifer Rubin RN  Outcome: Ongoing     Problem: ABCDS Injury Assessment  Goal: Absence of physical injury  1/19/2022 0859 by Jennifer Rubin RN  Outcome: Ongoing

## 2022-01-19 NOTE — PROGRESS NOTES
Hospitalist Progress Note      PCP: Raheem Pemberton MD    Date of Admission: 1/13/2022    Chief Complaint: Leg swelling and fatigue    Hospital Course: This 70-year-old female presents with with bilateral lower extremity edema, abdominal distention, and shortness of breath lasting for several weeks. There is history of DVT. Interval history    Pt seen and examined today. Overnight events noted, interval ancillary notes and labs reviewed. Status post paracentesis and EGD  On 3 L nasal cannula satting around 94%; wean as tolerated to keep sats above 92%  Afebrile, WBC is around 3.9, cultures negative to date  Low magnesium this morning: Replacement ordered  denied cough, SOB, chest pain, nausea, vomiting or abdominal pain      Assessment and plan    Acute pulmonary emboli  Has history of DVT  Started on Eliquis upon admission; Switched to lovenox with suspected gib and planned procedures  Oncology on board: Appreciate the recs    Lower extremity edema; Likely related to liver disease and ascites  Has bilateral DVT  On lovenox     Cirrhosis: New diagnosis per CT  Previous CT last year with IV contrast showed normal liver.   No history of alcohol abuse  Normal LFTs, platelets and mildly elevated INR  GI on board: Appreciate their recs   Hepatitis B, C and F-actin negative  AFP, A1AT, AMA, ceruloplasmin, CLAUDIA titer ordered     Ascites: Liver cirrhosis on CT  Doppler of hepatic vasculature normal  Status post paracentesis on 1/17/2022; about 6 L of fluid removed  Fluid negative for nutraceutical ascites; SAG low at 0.8  Awaiting for cytology and tumor markers  Started on Lasix and Aldactone    Nausea vomiting and abdominal pain:  Status post EGD on 1/18/22 which showed moderately severe, diffuse gastritis versus portal hypertensive gastropathy  Biopsy taken to rule out H. Pylori  No esophageal or gastric varices seen  Continue PPI, avoid NSAIDs  Repeat endoscopy in 1 year recommended    Mild right hydronephrosis per CT      DVT Prophylaxis: lovenox  Code Status: Full Code        Medications:  Reviewed    Infusion Medications    sodium chloride 100 mL/hr at 01/18/22 0959    sodium chloride       Scheduled Medications    furosemide  40 mg Oral Daily    spironolactone  100 mg Oral Daily    enoxaparin  1 mg/kg SubCUTAneous BID    dilTIAZem  120 mg Oral Daily    sodium chloride flush  5-40 mL IntraVENous 2 times per day    hydroCHLOROthiazide  12.5 mg Oral Daily    And    losartan  100 mg Oral Daily    atorvastatin  40 mg Oral Daily    pantoprazole  40 mg Oral QAM AC     PRN Meds: sodium chloride flush, sodium chloride, ondansetron **OR** ondansetron, polyethylene glycol, acetaminophen **OR** acetaminophen, aluminum & magnesium hydroxide-simethicone, perflutren lipid microspheres      Intake/Output Summary (Last 24 hours) at 1/19/2022 0922  Last data filed at 1/19/2022 0750  Gross per 24 hour   Intake    Output 1075 ml   Net -1075 ml       Physical Exam Performed:    /63   Pulse 64   Temp 98.2 °F (36.8 °C) (Oral)   Resp 15   Ht 5' 2\" (1.575 m)   Wt 157 lb 14.4 oz (71.6 kg)   SpO2 94%   BMI 28.88 kg/m²     General appearance: No apparent distress, appears stated age and cooperative. HEENT: Pupils equal, round, and reactive to light. Conjunctivae/corneas clear. Neck: Supple, with full range of motion. No jugular venous distention. Trachea midline. Respiratory:  Normal respiratory effort. Clear to auscultation, bilaterally without Rales/Wheezes/Rhonchi. Cardiovascular: Regular rate and rhythm with normal S1/S2 without murmurs, rubs or gallops. Abdomen: Soft, non-tender, non-distended with normal bowel sounds. Musculoskeletal: No clubbing, cyanosis or edema bilaterally. Full range of motion without deformity. Skin: Skin color, texture, turgor normal.  No rashes or lesions. Neurologic:  Neurovascularly intact without any focal sensory/motor deficits.  Cranial nerves: II-XII intact, grossly non-focal.  Psychiatric: Alert and oriented, thought content appropriate, normal insight  Capillary Refill: Brisk,< 3 seconds   Peripheral Pulses: +2 palpable, equal bilaterally       Labs:   Recent Labs     01/17/22  0422 01/18/22  0440 01/19/22  0538   WBC 4.9 4.4 3.9*   HGB 10.6* 10.4* 10.6*   HCT 31.3* 31.1* 31.1*    294 259     Recent Labs     01/17/22  0423 01/18/22  0440 01/19/22  0538   * 138 134*   K 3.5 3.5 3.7    104 101   CO2 24 26 25   BUN 15 13 11   CREATININE 0.8 0.9 0.9   CALCIUM 7.6* 7.8* 7.9*   PHOS 3.3 3.5 3.7     Recent Labs     01/18/22  0439   AST 12*   ALT 6*   BILIDIR <0.2   BILITOT 0.3   ALKPHOS 80     No results for input(s): INR in the last 72 hours. No results for input(s): [place] in the last 72 hours. Urinalysis:    No results found for: Tha Henle, BACTERIA, RBCUA, BLOODU, [address], [address]    Radiology:  US DUP ABD PEL RETRO SCROT COMPLETE   Final Result   Liver cirrhosis. The main portal vein is patent. The hepatic veins are patent. US LIVER   Final Result   Liver cirrhosis. The main portal vein is patent. The hepatic veins are patent. IR US GUIDED PARACENTESIS   Final Result   Successful paracentesis. VL Extremity Venous Bilateral   Final Result      CT ABDOMEN PELVIS W IV CONTRAST Additional Contrast? Oral   Final Result   Volume overload including large volume ascites and anasarca. Cirrhotic liver. No hepatic mass is visualized. Mild right hydronephrosis with distal ureteral tapering concerning for   ureteral stricture. RECOMMENDATIONS:   Unavailable         CT CHEST PULMONARY EMBOLISM W CONTRAST   Preliminary Result   Acute pulmonary emboli. No CT evidence of right heart strain. Moderate left and small right pleural effusions. Large amount of ascites. Probable cirrhosis.          XR CHEST PORTABLE   Final Result   Small right pleural effusion with adjacent atelectasis. Mild pulmonary edema.                  Active Hospital Problems    Diagnosis     Cirrhosis of liver with ascites (HCC) [K74.60, R18.8]     Nausea in adult [R11.0]     Ascites [R18.8]     Pulmonary hypertension ([address] [I27.20]     Acute hypoxemic respiratory failure ([address] [J96.01]     DVT (deep vein thrombosis)  [O22.30]     Hypoxia [R09.02]        Electronically signed by Kiesha Miles MD on 1/19/2022 at 9:22 AM

## 2022-01-20 ENCOUNTER — APPOINTMENT (OUTPATIENT)
Dept: GENERAL RADIOLOGY | Age: 82
DRG: 374 | End: 2022-01-20
Payer: MEDICARE

## 2022-01-20 LAB
AFP: 1.5 UG/L
AFP: 2 UG/L
ALPHA-1 ANTITRYPSIN: 215 MG/DL (ref 90–200)
ANION GAP SERPL CALCULATED.3IONS-SCNC: 12 MMOL/L (ref 3–16)
BASOPHILS ABSOLUTE: 0 K/UL (ref 0–0.2)
BASOPHILS RELATIVE PERCENT: 0.7 %
BODY FLUID CULTURE, STERILE: NORMAL
BUN BLDV-MCNC: 11 MG/DL (ref 7–20)
CA 125: 498.3 U/ML (ref 0–35)
CALCIUM SERPL-MCNC: 7.8 MG/DL (ref 8.3–10.6)
CEA: 3.2 NG/ML (ref 0–5)
CERULOPLASMIN: 21 MG/DL (ref 16–45)
CHLORIDE BLD-SCNC: 97 MMOL/L (ref 99–110)
CO2: 21 MMOL/L (ref 21–32)
CREAT SERPL-MCNC: 1 MG/DL (ref 0.6–1.2)
EOSINOPHILS ABSOLUTE: 0.1 K/UL (ref 0–0.6)
EOSINOPHILS RELATIVE PERCENT: 3.4 %
ESTIMATED AVERAGE GLUCOSE: 168.6 MG/DL
FOLATE: 8.12 NG/ML (ref 4.78–24.2)
GFR AFRICAN AMERICAN: >60
GFR NON-AFRICAN AMERICAN: 53
GLUCOSE BLD-MCNC: 131 MG/DL (ref 70–99)
GLUCOSE BLD-MCNC: 146 MG/DL (ref 70–99)
GLUCOSE BLD-MCNC: 154 MG/DL (ref 70–99)
GLUCOSE BLD-MCNC: 161 MG/DL (ref 70–99)
GLUCOSE BLD-MCNC: 174 MG/DL (ref 70–99)
GLUCOSE BLD-MCNC: 195 MG/DL (ref 70–99)
GRAM STAIN RESULT: NORMAL
HBA1C MFR BLD: 7.5 %
HCT VFR BLD CALC: 33.2 % (ref 36–48)
HEMOGLOBIN: 11.2 G/DL (ref 12–16)
LYMPHOCYTES ABSOLUTE: 0.5 K/UL (ref 1–5.1)
LYMPHOCYTES RELATIVE PERCENT: 11.5 %
MAGNESIUM: 1.9 MG/DL (ref 1.8–2.4)
MCH RBC QN AUTO: 30 PG (ref 26–34)
MCHC RBC AUTO-ENTMCNC: 33.7 G/DL (ref 31–36)
MCV RBC AUTO: 89 FL (ref 80–100)
MONOCYTES ABSOLUTE: 0.4 K/UL (ref 0–1.3)
MONOCYTES RELATIVE PERCENT: 9.5 %
NEUTROPHILS ABSOLUTE: 3.3 K/UL (ref 1.7–7.7)
NEUTROPHILS RELATIVE PERCENT: 74.9 %
PDW BLD-RTO: 15.3 % (ref 12.4–15.4)
PERFORMED ON: ABNORMAL
PHOSPHORUS: 4.1 MG/DL (ref 2.5–4.9)
PLATELET # BLD: 302 K/UL (ref 135–450)
PMV BLD AUTO: 7.1 FL (ref 5–10.5)
POTASSIUM SERPL-SCNC: 3.7 MMOL/L (ref 3.5–5.1)
PROCALCITONIN: 0.14 NG/ML (ref 0–0.15)
RBC # BLD: 3.73 M/UL (ref 4–5.2)
SODIUM BLD-SCNC: 130 MMOL/L (ref 136–145)
VITAMIN B-12: <150 PG/ML (ref 211–911)
WBC # BLD: 4.4 K/UL (ref 4–11)

## 2022-01-20 PROCEDURE — 82378 CARCINOEMBRYONIC ANTIGEN: CPT

## 2022-01-20 PROCEDURE — 84100 ASSAY OF PHOSPHORUS: CPT

## 2022-01-20 PROCEDURE — 82746 ASSAY OF FOLIC ACID SERUM: CPT

## 2022-01-20 PROCEDURE — 97530 THERAPEUTIC ACTIVITIES: CPT

## 2022-01-20 PROCEDURE — 6360000002 HC RX W HCPCS: Performed by: HOSPITALIST

## 2022-01-20 PROCEDURE — 97535 SELF CARE MNGMENT TRAINING: CPT

## 2022-01-20 PROCEDURE — 6370000000 HC RX 637 (ALT 250 FOR IP): Performed by: INTERNAL MEDICINE

## 2022-01-20 PROCEDURE — 85025 COMPLETE CBC W/AUTO DIFF WBC: CPT

## 2022-01-20 PROCEDURE — 36415 COLL VENOUS BLD VENIPUNCTURE: CPT

## 2022-01-20 PROCEDURE — 2700000000 HC OXYGEN THERAPY PER DAY

## 2022-01-20 PROCEDURE — 94680 O2 UPTK RST&XERS DIR SIMPLE: CPT

## 2022-01-20 PROCEDURE — 84145 PROCALCITONIN (PCT): CPT

## 2022-01-20 PROCEDURE — 82607 VITAMIN B-12: CPT

## 2022-01-20 PROCEDURE — 83735 ASSAY OF MAGNESIUM: CPT

## 2022-01-20 PROCEDURE — 6360000002 HC RX W HCPCS: Performed by: PHYSICIAN ASSISTANT

## 2022-01-20 PROCEDURE — 86304 IMMUNOASSAY TUMOR CA 125: CPT

## 2022-01-20 PROCEDURE — 80048 BASIC METABOLIC PNL TOTAL CA: CPT

## 2022-01-20 PROCEDURE — 2060000000 HC ICU INTERMEDIATE R&B

## 2022-01-20 PROCEDURE — 71045 X-RAY EXAM CHEST 1 VIEW: CPT

## 2022-01-20 PROCEDURE — 2580000003 HC RX 258: Performed by: HOSPITALIST

## 2022-01-20 PROCEDURE — 6370000000 HC RX 637 (ALT 250 FOR IP): Performed by: PHYSICIAN ASSISTANT

## 2022-01-20 PROCEDURE — 6370000000 HC RX 637 (ALT 250 FOR IP): Performed by: HOSPITALIST

## 2022-01-20 RX ADMIN — FUROSEMIDE 40 MG: 40 TABLET ORAL at 09:35

## 2022-01-20 RX ADMIN — SPIRONOLACTONE 100 MG: 25 TABLET ORAL at 09:35

## 2022-01-20 RX ADMIN — Medication 10 ML: at 20:04

## 2022-01-20 RX ADMIN — INSULIN LISPRO 1 UNITS: 100 INJECTION, SOLUTION INTRAVENOUS; SUBCUTANEOUS at 12:34

## 2022-01-20 RX ADMIN — INSULIN LISPRO 1 UNITS: 100 INJECTION, SOLUTION INTRAVENOUS; SUBCUTANEOUS at 20:09

## 2022-01-20 RX ADMIN — Medication 10 ML: at 09:36

## 2022-01-20 RX ADMIN — ENOXAPARIN SODIUM 80 MG: 100 INJECTION SUBCUTANEOUS at 09:35

## 2022-01-20 RX ADMIN — ONDANSETRON 4 MG: 2 INJECTION INTRAMUSCULAR; INTRAVENOUS at 05:17

## 2022-01-20 RX ADMIN — ENOXAPARIN SODIUM 80 MG: 100 INJECTION SUBCUTANEOUS at 20:04

## 2022-01-20 RX ADMIN — PANTOPRAZOLE SODIUM 40 MG: 40 TABLET, DELAYED RELEASE ORAL at 05:27

## 2022-01-20 RX ADMIN — DILTIAZEM HYDROCHLORIDE 120 MG: 60 CAPSULE, EXTENDED RELEASE ORAL at 09:36

## 2022-01-20 RX ADMIN — ATORVASTATIN CALCIUM 40 MG: 40 TABLET, FILM COATED ORAL at 09:35

## 2022-01-20 RX ADMIN — INSULIN LISPRO 1 UNITS: 100 INJECTION, SOLUTION INTRAVENOUS; SUBCUTANEOUS at 17:22

## 2022-01-20 ASSESSMENT — PAIN SCALES - GENERAL
PAINLEVEL_OUTOF10: 0

## 2022-01-20 NOTE — PROGRESS NOTES
[facility]   Progress Note      SUBJECTIVE:      I was asked to see the patient because acetic fluid showed adenocarcinoma. The patient is doing okay. No chest pain. She has some shortness of breath  She has some abdominal distention. Family is by bedside    OBJECTIVE    Physical  VITALS:  /66   Pulse 62   Temp 98.4 °F (36.9 °C) (Oral)   Resp 16   Ht 5' 2\" (1.575 m)   Wt 157 lb 14.4 oz (71.6 kg)   SpO2 93%   BMI 28.88 kg/m²   TEMPERATURE:  Current - Temp: 98.4 °F (36.9 °C); Max - Temp  Av.9 °F (36.6 °C)  Min: 97.3 °F (36.3 °C)  Max: 98.4 °F (36.9 °C)  BLOOD PRESSURE RANGE:  Systolic (52RGA), LZJ:180 , Min:106 , CONCETTA:791   ; Diastolic (27VLR), ZLZ:55, Min:63, Max:77    24HR INTAKE/OUTPUT:    Intake/Output Summary (Last 24 hours) at 2022  Last data filed at 2022 1833  Gross per 24 hour   Intake 750 ml   Output 1275 ml   Net -525 ml       Conscious alert and oriented. Mild pallor is present  Respiratory efforts are normal.  Abdomen mildly distended. Extremities mild edema noted.     Data  Labs:  General Labs:  CBC with Differential:    Lab Results   Component Value Date    WBC 3.9 2022    RBC 3.53 2022    HGB 10.6 2022    HCT 31.1 2022     2022    MCV 88.3 2022    MCH 30.0 2022    MCHC 33.9 2022    RDW 15.2 2022    LYMPHOPCT 11.4 2022    MONOPCT 10.2 2022    BASOPCT 0.8 2022    MONOSABS 0.4 2022    LYMPHSABS 0.4 2022    EOSABS 0.2 2022    BASOSABS 0.0 2022     BMP:    Lab Results   Component Value Date     2022    K 3.7 2022    K 3.4 2022     2022    CO2 25 2022    BUN 11 2022    LABALBU 2.4 2022    CREATININE 0.9 2022    CALCIUM 7.9 2022    GFRAA >60 2022    GFRAA >60 2012    LABGLOM >60 2022    GLUCOSE 134 2022     Hepatic Function Panel:    Lab Results   Component Value Date ALKPHOS 80 01/18/2022    ALT 6 01/18/2022    AST 12 01/18/2022    PROT 3.8 01/18/2022    BILITOT 0.3 01/18/2022    BILIDIR <0.2 01/18/2022    IBILI see below 01/18/2022    LABALBU 2.4 01/18/2022     LDH:  No results found for: LDH  PT/INR:    Lab Results   Component Value Date    PROTIME 17.3 01/14/2022    INR 1.51 01/14/2022     PTT:    Lab Results   Component Value Date    APTT 28.2 06/29/2016   [APTT    Current Medications  Current Facility-Administered Medications: insulin lispro (1 Unit Dial) 0-6 Units, 0-6 Units, SubCUTAneous, TID WC  insulin lispro (1 Unit Dial) 0-3 Units, 0-3 Units, SubCUTAneous, Nightly  glucose (GLUTOSE) 40 % oral gel 15 g, 15 g, Oral, PRN  dextrose 50 % IV solution, 12.5 g, IntraVENous, PRN  glucagon (rDNA) injection 1 mg, 1 mg, IntraMUSCular, PRN  dextrose 5 % solution, 100 mL/hr, IntraVENous, PRN  furosemide (LASIX) tablet 40 mg, 40 mg, Oral, Daily  spironolactone (ALDACTONE) tablet 100 mg, 100 mg, Oral, Daily  enoxaparin (LOVENOX) injection 80 mg, 1 mg/kg, SubCUTAneous, BID  dilTIAZem (CARDIZEM 12 HR) extended release capsule 120 mg, 120 mg, Oral, Daily  sodium chloride flush 0.9 % injection 5-40 mL, 5-40 mL, IntraVENous, 2 times per day  sodium chloride flush 0.9 % injection 5-40 mL, 5-40 mL, IntraVENous, PRN  0.9 % sodium chloride infusion, 25 mL, IntraVENous, PRN  ondansetron (ZOFRAN-ODT) disintegrating tablet 4 mg, 4 mg, Oral, Q8H PRN **OR** ondansetron (ZOFRAN) injection 4 mg, 4 mg, IntraVENous, Q6H PRN  polyethylene glycol (GLYCOLAX) packet 17 g, 17 g, Oral, Daily PRN  acetaminophen (TYLENOL) tablet 650 mg, 650 mg, Oral, Q6H PRN **OR** acetaminophen (TYLENOL) suppository 650 mg, 650 mg, Rectal, Q6H PRN  aluminum & magnesium hydroxide-simethicone (MAALOX) 200-200-20 MG/5ML suspension 30 mL, 30 mL, Oral, Q6H PRN  losartan (COZAAR) tablet 100 mg, 100 mg, Oral, Daily **AND** [DISCONTINUED] hydroCHLOROthiazide (HYDRODIURIL) tablet 12.5 mg, 12.5 mg, Oral, Daily  atorvastatin (LIPITOR) tablet 40 mg, 40 mg, Oral, Daily  pantoprazole (PROTONIX) tablet 40 mg, 40 mg, Oral, QAM AC  perflutren lipid microspheres (DEFINITY) injection 1.65 mg, 1.5 mL, IntraVENous, ONCE PRN    ASSESSMENT AND PLAN    65-year-old lady who was hospitalized with shortness of breath and was found to have pulmonary embolism on 1/13/2022. She was noted to have ascites and possibly cirrhosis. She has been on anticoagulation. Paracentesis was done on 1/17/2021. It showed malignant ascites    1. 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 -EGD done on 1/18/2022 was essentially negative for primary malignancy. Moderately severe diffuse gastritis versus portal hypertensive gastropathy was noted. Biopsies were taken.    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 Biopsy from stomach and duodenum are pending  -Agree with obtaining tumor markers-CEA, CA 199 and C125 although this may not be diagnostic.  -Colonoscopy was done at least 8 years ago.  -We will obtain next generation sequencing with tumor seek on the cytology specimen.  -Do not see value of doing liver biopsy.  -The patient and family have expressed wishes not to undergo chemotherapy. I told them that we can explore other treatment options like immunotherapy if she has appropriate markers on next generation sequencing. Told them that next generation sequencing results can take up to 2 weeks. 2.  Pulmonary embolism and DVT:    -Most likely due to malignancy.  -Okay to discharge home on DOAC. Currently she is on Lovenox    3. History of renal cell carcinoma in 2016    4.   Mild anemia:    Due to malignancy  Check B12 folate  Iron studies are consistent with anemia of chronic disease and possible iron deficiency    Okay to discharge from oncology perspective.   Follow-up with Dr. Chloé Toth as outpatient      Neeraj Lloyd MD

## 2022-01-20 NOTE — PROGRESS NOTES
1/15/22  Conclusions      Summary   LV systolic function is hyperdynamic wit EF visually estimated to be 65-70%. No regional wall motion abnormalities are noted. LV cavity size appears small with mid-cavity obliteration. There is a late peaking gradient of 26mmHg at rest which increases to 65mmHg   with Valsalva. No obvious systolic anterior motion of mitral valve. There   appears to be chordal SUZETTE.   Indeterminate diastolic function. Aortic valve appears sclerotic but opens adequately. Mild aortic regurgitation   Mild tricuspid regurgitation with a PASP of 47mmHg c/w mild pulm HTN. Possible pleural effusion. ASSESSMENT     Gastric cancer, Nausea and vomiting -intermittent N/V over the past month. EGD 1/18 with moderately severe, diffuse gastritis vs portal hypertensive gastropathy. 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 Pt reports her mother and grandfather had \"stomach\" cancer. Abnormal liver on CT  - CT read as cirrhosis but  Prior CT last year with IV contrast with normal liver. Liver enzymes are normal.  Normal platelets. INR mildly elevated. No history of alcohol abuse. No esophageal or gastric varices on EGD 1/18/22. No encephalopathy. Negative: Hepatitis B and C, f-actin, A1AT phenotype. Positive CLAUDIA but < 1:80. Ferritin 600 but iron sat is normal at 15%. Doppler of hepatic vasculature was ok. AFP neg. Suspect liver findings on CT are from tumor. Malignant ascites - s/p para 1/17 with 6 L fluid removed. Neg for neutrocytic ascites. Protein was high which can be seen with cardiac ascites. But SAAG is low at 0.8 so this is NOT consistent with portal hypertension as would expect if ascites is due to cirrhosis. Cytology positive for adenocarcinoma. Oncology following. PE, DVT -hematology has eval patient. on therapeutic lovenox.    Right hydronephrosis - mild per CT. per primary team.        PLAN    - PPI  - per oncology  - we will sign off for now.     Discussed with GABRIELLE MorenoC  [id number]    I have seen and examined this patient and agree with the assessment and plan as outlined by the nurse practitioner or physician assistant.

## 2022-01-20 NOTE — PROGRESS NOTES
Occupational Therapy  Mariola Lynn    Patient chart reviewed and assessed for therapy treatment session this date. Patient in middle of eating lunch upon arrival, patient refused at this time. Patient agreeable to therapy later this date. Will re-attempt later this date as schedule allows.   Thanks,     Alisson Lopez, OTR/L [id number]

## 2022-01-20 NOTE — PROGRESS NOTES
Hospitalist Progress Note      PCP: Reagan Lizama MD    Date of Admission: 1/13/2022    Chief Complaint: Leg swelling and fatigue    Hospital Course: This 72-year-old female presents with with bilateral lower extremity edema, abdominal distention, and shortness of breath lasting for several weeks. There is history of DVT. Interval history    Pt seen and examined today. Overnight events noted, interval ancillary notes and labs reviewed. Status post paracentesis and EGD  On 3 L nasal cannula satting around 94%; wean as tolerated to keep sats above 92%  Afebrile, WBC is around 3.9, cultures negative to date  Low magnesium this morning: Replacement ordered  denied cough, SOB, chest pain, nausea, vomiting or abdominal pain      Assessment and plan    Malignant ascites: Likely secondary to gastric adenocarcinoma  Status post paracentesis on 1/17/2022; about 6 L of fluid removed  Fluid negative for nutraceutical ascites; SAG low at 0.8. Cytology positive for adenocarcinoma  Oncology on board: Plan for next generation sequencing with tumor seek on the cytology specimen. Palliative care consulted for CODE STATUS discussion and goals of care clarification    Abnormal liver on CT: Read as cirrhosis but previous  CT last year with IV contrast showed normal liver. No history of alcohol abuse, Normal LFTs, platelets and mildly elevated INR  GI on board: Appreciate their recs  Doppler of hepatic vasculature was ok. AFP neg. Hepatitis B, C and F-actin negative    A1AT phenotype. Positive CLAUDIA but < 1:80. Ferritin 600 but iron sat is normal at 15%.      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 Cardiovascular: Regular rate and rhythm with normal S1/S2 without murmurs, rubs or gallops. Abdomen: Soft, non-tender, non-distended with normal bowel sounds. Musculoskeletal: No clubbing, cyanosis or edema bilaterally. Full range of motion without deformity. Skin: Skin color, texture, turgor normal.  No rashes or lesions. Neurologic:  Neurovascularly intact without any focal sensory/motor deficits. Cranial nerves: II-XII intact, grossly non-focal.  Psychiatric: Alert and oriented, thought content appropriate, normal insight  Capillary Refill: Brisk,< 3 seconds   Peripheral Pulses: +2 palpable, equal bilaterally       Labs:   Recent Labs     01/18/22  0440 01/19/22  0538 01/20/22  0510   WBC 4.4 3.9* 4.4   HGB 10.4* 10.6* 11.2*   HCT 31.1* 31.1* 33.2*    259 302     Recent Labs     01/18/22  0440 01/19/22  0538 01/20/22  0510    134* 130*   K 3.5 3.7 3.7    101 97*   CO2 26 25 21   BUN 13 11 11   CREATININE 0.9 0.9 1.0   CALCIUM 7.8* 7.9* 7.8*   PHOS 3.5 3.7 4.1     Recent Labs     01/18/22  0439   AST 12*   ALT 6*   BILIDIR <0.2   BILITOT 0.3   ALKPHOS 80     No results for input(s): INR in the last 72 hours. No results for input(s): Ruba Emir in the last 72 hours. Urinalysis:    No results found for: Ryley Kelly, BACTERIA, RBCUA, BLOODU, [address], [address]    Radiology:  US DUP ABD PEL RETRO SCROT COMPLETE   Final Result   Liver cirrhosis. The main portal vein is patent. The hepatic veins are patent. US LIVER   Final Result   Liver cirrhosis. The main portal vein is patent. The hepatic veins are patent. IR US GUIDED PARACENTESIS   Final Result   Successful paracentesis. VL Extremity Venous Bilateral   Final Result      CT ABDOMEN PELVIS W IV CONTRAST Additional Contrast? Oral   Final Result   Volume overload including large volume ascites and anasarca. Cirrhotic liver. No hepatic mass is visualized.       Mild right hydronephrosis with distal ureteral tapering concerning for   ureteral stricture. RECOMMENDATIONS:   Unavailable         CT CHEST PULMONARY EMBOLISM W CONTRAST   Final Result   Acute pulmonary emboli. No CT evidence of right heart strain. Moderate left and small right pleural effusions. Large amount of ascites. Probable cirrhosis. XR CHEST PORTABLE   Final Result   Small right pleural effusion with adjacent atelectasis. Mild pulmonary edema.          XR CHEST PORTABLE    (Results Pending)           Active Hospital Problems    Diagnosis     Cirrhosis of liver with ascites (HCC) [K74.60, R18.8]     Nausea in adult [R11.0]     Ascites [R18.8]     Pulmonary hypertension ([address] [I27.20]     Acute hypoxemic respiratory failure ([address] [J96.01]     DVT (deep vein thrombosis)  [O22.30]     Hypoxia [R09.02]        Electronically signed by Andreina Norris MD on 1/20/2022 at 8:52 AM

## 2022-01-20 NOTE — CONSULTS
PALLIATIVE MEDICINE CONSULTATION     Patient name:Yong Inman   HGP:[id number]    :1940  Room/Bed:[facility]3368/3368-01   LOS: 7 days         Date of consult:2022    Consult Information  Palliative Medicine Consult performed by: MRALENE Bethea CNP      Inpatient consult to Palliative Care  Consult performed by: MARLENE Bethea CNP  Consult ordered by: Scott Castañeda MD  Reason for consult: [address] and code status        Number of admissions past 12 months: 0      ASSESSMENT/RECOMMENDATIONS     80 y.o. female with edema and dyspnea      Symptom Management:  Edema- 3+ edema in her legs  Dyspnea- pt on 2L and stable after walking   Goals of Care- talked to pt and son and they all agree that pt wants comfort care only and wants to return home with Hospice. Family wants HOC they had them for pts . They are planning to take her home tomorrow and needs some equipment at home. Patient/Family Goals of Care :    talked to pt and son and they all agree that pt wants comfort care only and wants to return home with Hospice. Family wants HOC they had them for pts . They are planning to take her home tomorrow and needs some equipment at home. Disposition/Discharge Plan:   Plan DC home with HOC tomorrow    Advance Directives:  Surrogate Decision Maker: Alex Cornelius  Code status:  DNR-CC    Case discussed with: patient, floor RN, Dr Gerardo Burrell  Thank you for allowing us to participate in the care of this patient. HISTORY     CC: Dyspnea  HPI: The patient is a 80 y.o. female with admitted on 2022 for fatigue, shortness of breath and and leg swelling. D-dimer was > 5250 on admission. CT pulmonary angiogram shows acute emboli within the lobar and segmental arteries of the right lung, no evidence of right heart strain. Cirrhotic liver and large amount of ascites was also noted. She denies history of liver disease.      Palliative Medicine SymptomScreening/ROS:  Review of Systems -   History obtained from chart review and the patient  General ROS: positive for  - fatigue, malaise and weight gain  Respiratory ROS: positive for - shortness of breath and tachypnea     A complete 10 count ROS was obtained. Pertinent positives mentioned above in HPI/ROS. All others if not mentioned are negative.        Pain:    Home med list and hospital medications reviewed in chart as of 1/20/2022     EXAM     Vitals:    01/20/22 1145   BP: 122/79   Pulse: 73   Resp: 18   Temp: 98.7 °F (37.1 °C)   SpO2: 97%       Physical Examination:   General appearance - chronically ill appearing  Mental status - alert, oriented to person, place, and time, normal mood, behavior, speech, dress, motor activity, and thought processes  Neck - supple, no significant adenopathy  Chest - no tachypnea, retractions or cyanosis  Abdomen - soft, nontender, nondistended, no masses or organomegaly  Musculoskeletal - no joint tenderness, deformity or swelling  Skin - normal coloration and turgor, no rashes, no suspicious skin lesions noted        Current labs in the epic chart reviewed as of 1/20/2022   Review of previous notes, admits, labs, radiology and testing relevant to this consult done in this chart today 1/20/2022      Total time: 70 minutes  >50% of time spent counseling patient at bedside or POA/family member if applicable , reviewing information and discussing care, coordinating with care team  Signed By: Electronically signed by MARLENE Maloney CNP on 1/20/2022 at 2:08 PM  Palliative Medicine   [phone]    January 20, 2022

## 2022-01-20 NOTE — PROGRESS NOTES
01/20/22 1257   Resting (Room Air)   SpO2 89   Resting (On O2)   SpO2 93   O2 Flow Rate (l/min) 1 l/min   During Walk (Room Air)   SpO2 89   During Walk (On O2)   SpO2 92   O2 Flow Rate (l/min) 1 l/min   Need Additional O2 Flow Rate Rows No   Rate of Dyspnea 0   After Walk   SpO2 94   Does the Patient Qualify for Home O2 Yes   Liter Flow at Rest 1   Liter Flow on Exertion 1   Does the Patient Need Portable Oxygen Tanks Yes

## 2022-01-20 NOTE — PROGRESS NOTES
Occupational Therapy  Facility/Department: [address]  Daily Treatment Note  NAME: Doug Ward  : 1940  MRN: [id number]    Date of Service: 2022    Discharge Recommendations: Doug Ward scored a 18/24 on the AM-PAC ADL Inpatient form. Current research shows that an AM-PAC score of 18 or greater is typically associated with a discharge to the patient's home setting. Based on the patient's AM-PAC score, and their current ADL deficits, it is recommended that the patient have 2-3 sessions per week of Occupational Therapy at d/c to increase the patient's independence. At this time, this patient demonstrates the endurance and safety to discharge home with home health care (home vs OP services) and a follow up treatment frequency of 2-3x/wk. Please see assessment section for further patient specific details. HOME HEALTH CARE: LEVEL 3 SAFETY     - Initial home health evaluation to occur within 24-48 hours, in patient home   - Therapy evaluations in home within 24-48 hours of discharge; including DME and home safety   - Frontload therapy 5 days, then 3x a week   - Therapy to evaluate if patient has [address] needs for personal care   -  evaluation within 24-48 hours, includes evaluation of resources and insurance to determine [place], [place], LTC, and Medicaid options       If patient discharges prior to next session this note will serve as a discharge summary. Please see below for the latest assessment towards goals. OT Equipment Recommendations  Equipment Needed: Yes  Mobility Devices: ADL Assistive Devices  ADL Assistive Devices: Shower Chair with back  Other: for energy conservation    Assessment   Performance deficits / Impairments: Decreased functional mobility ; Decreased ADL status; Decreased endurance;Decreased high-level IADLs  Assessment: Pt is not at baseline level of function and will benefit from continued OT to address the above limitations.   Treatment Diagnosis: Decreased functional mobility, ADL/IADL status, activity tolerance related to ascites  Prognosis: Good  Decision Making: Medium Complexity  OT Education: OT Role;Plan of Care;ADL Adaptive Strategies; Energy Conservation  Patient Education: d/c recommendations - pt verbalized understanding  Barriers to Learning: hearing  REQUIRES OT FOLLOW UP: Yes  Activity Tolerance  Activity Tolerance: Patient Tolerated treatment well;Patient limited by fatigue  Activity Tolerance: Patient with some SOB and fatigue following initial functional mobility tasks, O2 ranged between 90-92% on 1L and O2 increased to following due to SOB. Patient upon return to room O2 sats remained between 95-90% on 1L O2. Safety Devices  Safety Devices in place: Yes  Type of devices: All fall risk precautions in place; Left in chair;Nurse notified;Call light within reach; Chair alarm in place; Patient at risk for falls;Gait belt  Restraints  Initially in place: No         Patient Diagnosis(es): The primary encounter diagnosis was Acute pulmonary embolism without acute cor pulmonale, unspecified pulmonary embolism type ([facility] Diagnoses of Cirrhosis of liver with ascites, unspecified hepatic cirrhosis type ([address] and Pleural effusion, bilateral were also pertinent to this visit. has a past medical history of Cancer ([address] Diabetes mellitus ([address] DVT, lower extremity ([address] Heart murmur, Hyperlipidemia, Hypertension, and Pulmonary embolism ([address] has a past surgical history that includes Skin cancer excision; Hysterectomy; Colonoscopy; other surgical history (11/21/13); Cystocopy (11/21/13); Appendectomy; partial nephrectomy (Right, 07/12/2016); and Upper gastrointestinal endoscopy (N/A, 1/18/2022).     Restrictions  Restrictions/Precautions  Restrictions/Precautions: Fall Risk (high fall risk)  Required Braces or Orthoses?: No  Position Activity Restriction  Other position/activity restrictions: Christopher Archer is a 80 y.o. female on [facility] Sol MD service who was admitted on 1/13/2022 for fatigue, shortness of breath and and leg swelling. D-dimer was > 5250 on admission. CT pulmonary angiogram shows acute emboli within the lobar and segmental arteries of the right lung, no evidence of right heart strain. Cirrhotic liver and large amount of ascites was also noted. She denies history of liver disease. She has a history of DVT after hip surgery in 2008. She also had PE and DVT in 2016 after partial nephrectomy for renal cell carcinoma in 2016. She was treated with Xarelto. She did not require adjuvant therapy for her cancer. She denies recent surgery, travel and injury. She denies sedentary lifestyle. She reports that her son and her granddaughter have a blood clotting disorder but does not know what mutation.   Subjective   General  Chart Reviewed: Yes  Patient assessed for rehabilitation services?: Yes  Additional Pertinent Hx: skin cancer, DVT, DM  Response to previous treatment: Patient with no complaints from previous session  Family / Caregiver Present: Yes (Son and son's girlfriend present)  Diagnosis: ascites  Subjective  Subjective: Patient seated in recliner with family present upon arrival, agreeable to therapy treatment session  Pain Assessment  Pain Assessment: 0-10  Pain Level: 0  Patient's Stated Pain Goal: No pain  Vital Signs  Patient Currently in Pain: No   Orientation  Orientation  Overall Orientation Status: Within Functional Limits  Objective             Balance  Sitting Balance: Supervision  Standing Balance: Stand by assistance  Standing Balance  Time: ~10-12 minutes  Activity: Functional transfers, functional mobility, ADL completion  Functional Mobility  Functional - Mobility Device: Rolling Walker  Activity: Other  Assist Level: Contact guard assistance  Functional Mobility Comments: Patient with use of RW with CGA for initial ambulation, patient ambulated ~100ft and with some fatigue and SOB patient increased from 1L O2 to 2L, patient took a seated rest break of 2 minutes and then ambulated 100ft back to room. Patient with SPO2 at 92% following functional mobility tasks. Patient then ambulated ~20ft to/from bathroom with no device with CGA to the bathroom  Toilet Transfers  Toilet - Technique: Ambulating; To right  Equipment Used: Standard toilet  Toilet Transfer: Stand by assistance  Bed mobility  Comment: Patient seated in recliner upon arrival, no bed mobility observed  Transfers  Sit to stand: Stand by assistance  Stand to sit: Stand by assistance                    Vision  Patient Visual Report: No visual complaint reported.   Cognition  Overall Cognitive Status: WFL     Perception  Overall Perceptual Status: WFL            Plan   Plan  Times per week: 3-5x  Times per day: Daily  Current Treatment Recommendations: Functional Mobility Training,Safety Education & Training,Self-Care / ADL,Patient/Caregiver Education & Training,Equipment Evaluation, Education, & procurement,Home Management Training,Endurance Training    AM-PAC Score   AM-PAC Inpatient Daily Activity Raw Score: 18 (01/20/22 1445)  AM-PAC Inpatient ADL T-Scale Score : 38.66 (01/20/22 1445)  ADL Inpatient CMS 0-100% Score: 46.65 (01/20/22 1445)  ADL Inpatient CMS G-Code Modifier : CK (01/20/22 1445)    Goals  Short term goals  Time Frame for Short term goals: Discharge  Short term goal 1: Mod I for all UB ADLs - SBA 1/20  Short term goal 2: Mod I for all LB ADLs - maxA 1/20  Short term goal 3: Mod I for functional mobility - CGA 1/20  Short term goal 4: Mod I for functional transfers - SBA 1/20  Short term goal 5: Mod I for light IADL task - ongoing 1/20  Long term goals  Time Frame for Long term goals : STG=LTG  Patient Goals   Patient goals : To d/c home       Therapy Time   Individual Concurrent Group Co-treatment   Time In 1333         Time Out 1406         Minutes 33              Timed Code Treatment Minutes: 33 minutes  Total Treatment Minutes:  33 minutes      Alisson Lopez, OTR/L [id number]

## 2022-01-21 VITALS
BODY MASS INDEX: 28.6 KG/M2 | HEIGHT: 62 IN | HEART RATE: 65 BPM | DIASTOLIC BLOOD PRESSURE: 71 MMHG | WEIGHT: 155.4 LBS | TEMPERATURE: 98.3 F | RESPIRATION RATE: 18 BRPM | SYSTOLIC BLOOD PRESSURE: 110 MMHG | OXYGEN SATURATION: 96 %

## 2022-01-21 LAB
CERULOPLASMIN: 22 MG/DL (ref 16–45)
GLUCOSE BLD-MCNC: 144 MG/DL (ref 70–99)
GLUCOSE BLD-MCNC: 158 MG/DL (ref 70–99)
MITOCHONDRIAL M2 AB, IGG: 0.5 U/ML (ref 0–4)
PERFORMED ON: ABNORMAL
PERFORMED ON: ABNORMAL
QUANTI TB GOLD PLUS: NEGATIVE
QUANTI TB1 MINUS NIL: 0 IU/ML (ref 0–0.34)
QUANTI TB2 MINUS NIL: 0 IU/ML (ref 0–0.34)
QUANTIFERON MITOGEN: 8.72 IU/ML
QUANTIFERON NIL: 0.02 IU/ML

## 2022-01-21 PROCEDURE — 6370000000 HC RX 637 (ALT 250 FOR IP): Performed by: HOSPITALIST

## 2022-01-21 PROCEDURE — 2580000003 HC RX 258: Performed by: HOSPITALIST

## 2022-01-21 PROCEDURE — 6370000000 HC RX 637 (ALT 250 FOR IP): Performed by: PHYSICIAN ASSISTANT

## 2022-01-21 PROCEDURE — 6360000002 HC RX W HCPCS: Performed by: PHYSICIAN ASSISTANT

## 2022-01-21 RX ORDER — FUROSEMIDE 40 MG/1
40 TABLET ORAL DAILY
Qty: 30 TABLET | Refills: 1 | Status: SHIPPED | OUTPATIENT
Start: 2022-01-21

## 2022-01-21 RX ORDER — LOSARTAN POTASSIUM 100 MG/1
100 TABLET ORAL DAILY
Qty: 30 TABLET | Refills: 1 | Status: SHIPPED | OUTPATIENT
Start: 2022-01-22

## 2022-01-21 RX ADMIN — PANTOPRAZOLE SODIUM 40 MG: 40 TABLET, DELAYED RELEASE ORAL at 05:21

## 2022-01-21 RX ADMIN — INSULIN LISPRO 1 UNITS: 100 INJECTION, SOLUTION INTRAVENOUS; SUBCUTANEOUS at 09:55

## 2022-01-21 RX ADMIN — SPIRONOLACTONE 100 MG: 25 TABLET ORAL at 09:55

## 2022-01-21 RX ADMIN — ATORVASTATIN CALCIUM 40 MG: 40 TABLET, FILM COATED ORAL at 09:55

## 2022-01-21 RX ADMIN — LOSARTAN POTASSIUM 100 MG: 100 TABLET, FILM COATED ORAL at 09:55

## 2022-01-21 RX ADMIN — ENOXAPARIN SODIUM 80 MG: 100 INJECTION SUBCUTANEOUS at 09:55

## 2022-01-21 RX ADMIN — Medication 10 ML: at 09:55

## 2022-01-21 RX ADMIN — DILTIAZEM HYDROCHLORIDE 120 MG: 60 CAPSULE, EXTENDED RELEASE ORAL at 09:55

## 2022-01-21 RX ADMIN — FUROSEMIDE 40 MG: 40 TABLET ORAL at 09:55

## 2022-01-21 ASSESSMENT — PAIN SCALES - GENERAL
PAINLEVEL_OUTOF10: 0

## 2022-01-21 NOTE — PROGRESS NOTES
Referral received for information regarding Advance Directives - see ACP note. Spiritual support provided through prayer and pastoral presence. Son Nadja Phipps and his girlfriend came in at end of visit. He requested polo [facility] for himself which I provided to him.

## 2022-01-21 NOTE — ACP (ADVANCE CARE PLANNING)
Advance Care Planning     Advance Care Planning Inpatient Note  [facility] Department    Today's Date: 1/21/2022  Unit: [facility]    Received request from IDT Member. Upon review of chart and communication with care team, patient's decision making abilities are not in question. . Patient and  were present in the room during visit. Goals of ACP Conversation:  Discuss advance care planning documents    Health Care Decision Makers:       Primary Decision Maker: Norris Garnett - Child - [phone]    Summary:  Documented Next of Kin, per patient report  Patient verbalized that she wants her younger son, Gregg Blood to be primary decision maker if she is unable to make decisions. Advance Care Planning Documents (Patient Wishes):  None     Assessment:  Patient seemed to be quite alert and oriented and was able to verbalize understanding of documents and the need to have her signature witnessed. There was no hesitation in naming Gregg Blood as her choice for primary decision maker. Interventions:  Provided education on documents for clarity and greater understanding. Provided patient with HCPOA and LW documents along with printed information about the forms and process. Instructed her to not sign them until asking nurse to call for a  to come to help with the witnessing and completing the process. She expressed understanding.      Care Preferences Communicated:   No    Outcomes/Plan:  ACP Discussion: Postponed    Electronically signed by Chaplain Naa on 1/21/2022 at 8:45 AM

## 2022-01-21 NOTE — PROGRESS NOTES
Oncology Hematology Care   Progress Note      1/21/2022 10:05 AM        Name: Thor Cross . Admitted: 1/13/2022    SUBJECTIVE:  She is feeling OK, she offers no complaints, she does not want chemotherapy, she is agreeable to hospice.       Reviewed interval ancillary notes    Current Medications  insulin lispro (1 Unit Dial) 0-6 Units, TID WC  insulin lispro (1 Unit Dial) 0-3 Units, Nightly  glucose (GLUTOSE) 40 % oral gel 15 g, PRN  dextrose 50 % IV solution, PRN  glucagon (rDNA) injection 1 mg, PRN  dextrose 5 % solution, PRN  furosemide (LASIX) tablet 40 mg, Daily  spironolactone (ALDACTONE) tablet 100 mg, Daily  enoxaparin (LOVENOX) injection 80 mg, BID  dilTIAZem (CARDIZEM 12 HR) extended release capsule 120 mg, Daily  sodium chloride flush 0.9 % injection 5-40 mL, 2 times per day  sodium chloride flush 0.9 % injection 5-40 mL, PRN  0.9 % sodium chloride infusion, PRN  ondansetron (ZOFRAN-ODT) disintegrating tablet 4 mg, Q8H PRN   Or  ondansetron (ZOFRAN) injection 4 mg, Q6H PRN  polyethylene glycol (GLYCOLAX) packet 17 g, Daily PRN  acetaminophen (TYLENOL) tablet 650 mg, Q6H PRN   Or  acetaminophen (TYLENOL) suppository 650 mg, Q6H PRN  aluminum & magnesium hydroxide-simethicone (MAALOX) 200-200-20 MG/5ML suspension 30 mL, Q6H PRN  losartan (COZAAR) tablet 100 mg, Daily  atorvastatin (LIPITOR) tablet 40 mg, Daily  pantoprazole (PROTONIX) tablet 40 mg, QAM AC  perflutren lipid microspheres (DEFINITY) injection 1.65 mg, ONCE PRN        Objective:  /71   Pulse 65   Temp 98.3 °F (36.8 °C) (Oral)   Resp 18   Ht 5' 2\" (1.575 m)   Wt 155 lb 6.4 oz (70.5 kg)   SpO2 96%   BMI 28.42 kg/m²     Intake/Output Summary (Last 24 hours) at 1/21/2022 1005  Last data filed at 1/21/2022 0531  Gross per 24 hour   Intake 550 ml   Output 1500 ml   Net -950 ml      Wt Readings from Last 3 Encounters:   01/21/22 155 lb 6.4 oz (70.5 kg)   07/12/16 195 lb 12.3 oz (88.8 kg)   06/29/16 205 lb (93 kg) General appearance:  Appears comfortable  Eyes: Sclera clear. Pupils equal.  ENT: Moist oral mucosa. Trachea midline, no adenopathy. Cardiovascular: Regular rhythm, normal S1, S2. No murmur. No edema in lower extremities  Respiratory: Not using accessory muscles. Good inspiratory effort. Clear to auscultation bilaterally, no wheeze or crackles. GI: Abdomen soft, no tenderness, not distended  Musculoskeletal: No cyanosis in digits, neck supple  Neurology: CN 2-12 grossly intact. No speech or motor deficits  Psych: Normal affect. Alert and oriented in time, place and person  Skin: Warm, dry, normal turgor    Labs and Tests:  CBC:   Recent Labs     01/19/22  0538 01/20/22  0510   WBC 3.9* 4.4   HGB 10.6* 11.2*    302     BMP:    Recent Labs     01/19/22  0538 01/20/22  0510   * 130*   K 3.7 3.7    97*   CO2 25 21   BUN 11 11   CREATININE 0.9 1.0   GLUCOSE 134* 146*     Hepatic: No results for input(s): AST, ALT, ALB, BILITOT, ALKPHOS in the last 72 hours. ASSESSMENT AND PLAN    Active Problems:    Hypoxia    DVT (deep vein thrombosis)     Pulmonary hypertension (HCC)    Acute hypoxemic respiratory failure (HCC)    Ascites    Cirrhosis of liver with ascites (HCC)    Nausea in adult  Resolved Problems:    * No resolved hospital problems. *      1. Malignant ascites:     -Newly diagnosed on paracentesis done 1/17/2022  -Adenocarcinoma possibly upper GI tract and pancreatobiliary primary. -EGD done on 1/18/2022 was essentially negative for primary malignancy. Moderately severe diffuse gastritis versus portal hypertensive gastropathy was noted. Biopsies of stomach showed poorly-differentiated adenocarcinoma  Results discussed with patient. She is not interested in chemotherapy. Next Generation sequencing has been sent to see if immunotherapy is an option  As of now, patient plans to discharge home with hospice.      2.  Pulmonary embolism and DVT:     -Most likely due to malignancy.  -Okay to discharge home on DOAC. Currently she is on Lovenox     3. History of renal cell carcinoma in 2016     4. Mild anemia:     Due to malignancy  Check B12 folate  Iron studies are consistent with anemia of chronic disease and possible iron deficiency    OK for discharge from oncology perspective. Hyun Basurto, MAKSIM  Oncology Hematology Care    Patient was seen with LUISA this morning. Metastatic poorly differentiated gastric cancer. EGD was positive. Peritoneal carcinomatosis. Explained to the patient and planned to discuss with the family. However no family member was present. The patient prefers hospice care. Will be discharged with hospice.       Joanna Elena MD

## 2022-01-21 NOTE — PROGRESS NOTES
Data- discharge order received, pt verbalized agreement to discharge, disposition to previous residence, no needs for HHC/DME. Action- discharge instructions prepared and given to family, pt verbalized understanding. Medication information packet given r/t NEW and/or CHANGED prescriptions emphasizing name/purpose/side effects, pt verbalized understanding. Discharge instruction summary: Diet- Carb, Low Sodium, Activity- Resume as tolerated, Primary Care Physician as follows: Shakir Mercer -[phone] f/u appointment 1-2 weeks, immunizations reviewed and up-to-date, prescription medications filled at pharmacy of choice. Inpatient surgical procedure precautions reviewed: N/A        1. WEIGHT: Admit Weight: 198 lb (89.8 kg) (01/13/22 1643)        Today  Weight: 155 lb 6.4 oz (70.5 kg) (01/21/22 0744)       2. O2 SAT.: SpO2: 96 % (01/21/22 0930)    Response- Pt belongings gathered, IV removed. Disposition is home (no HHC/DME needs), transported with belongings, taken to lobby via w/c w/ family, no complications. **  Patient discharged home with [facility] services.

## 2022-01-29 NOTE — DISCHARGE SUMMARY
Hospital Medicine Discharge Summary    Patient ID: Suzie Wagner      Patient's PCP: Vandana Steen MD    Admit Date: 1/13/2022     Discharge Date: 1/21/2022     Admitting Physician: Julian Mckinney MD     Discharge Physician: Holden Resendez MD        Active Hospital Problems    Diagnosis     [facility] [K74.60, R18.8]     Nausea in adult [R11.0]     Ascites [R18.8]     Pulmonary hypertension ([facility] [I27.20]     Acute hypoxemic respiratory failure ([facility] [J96.01]     DVT (deep vein thrombosis)  [O22.30]     Hypoxia [R09.02]          Hospital Course: This 61-year-old female with PMHx of renal cell carcinoma in 2016 presented with bilateral lower extremity edema, abdominal distention, and shortness of breath from past several weeks. On admission, CT showed cirrhosis of liver. GI was consulted and patient underwent Doppler of hepatic vasculature which was normal.  AFP neg. Hepatitis B, C and F-actin negative .  A1AT phenotype. Positive CLAUDIA but < 1:80. Ferritin 600 but iron sat is normal at 15%. Patient underwent EGD on 1/18/22 which showed moderately severe, diffuse gastritis versus portal hypertensive gastropathy. No esophageal or gastric varices seen. 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       Malignant ascites: Likely secondary to gastric adenocarcinoma. Patient underwent paracentesis on 1/17/2022; about 6 L of fluid removed. Fluid negative for nutraceutical ascites; SAG low at 0.8. Cytology positive for adenocarcinoma  Oncology was consulted and per their recs, there was plan for next generation sequencing but patient refused chemotherapy and possibility of immunotherapy. Palliative care was consulted and patient CODE STATUS to DNR CC.   Patient was discharged with hospice     Acute pulmonary emboli and DVT likely secondary to malignancy  Started on Eliquis      Physical Exam Performed:     /71 Pulse 65   Temp 98.3 °F (36.8 °C) (Oral)   Resp 18   Ht 5' 2\" (1.575 m)   Wt 155 lb 6.4 oz (70.5 kg)   SpO2 96%   BMI 28.42 kg/m²     General appearance:  No apparent distress, appears stated age and cooperative. Eyes: Sclera clear. Pupils equal.  ENT: Moist oral mucosa. Trachea midline, no adenopathy. Cardiovascular: Regular rhythm, normal S1, S2. No murmur. No edema in lower extremities  Respiratory:Not usingaccessory muscles. Good inspiratory effort. Clear to auscultation bilaterally, no wheeze or crackles. GI: Abdomen soft, no tenderness, distended, normal bowel sounds  Musculoskeletal: Normal ROM, No cyanosis in digits. Neurology: CN 2-12 grossly intact. No speech or motor deficits  Psych: Normal affect. Alert and oriented in time, place and person  Skin: Warm, dry, normal turgor      Labs: For convenience and continuity at follow-up the following most recent labs are provided:      CBC:    Lab Results   Component Value Date    WBC 4.4 01/20/2022    HGB 11.2 01/20/2022    HCT 33.2 01/20/2022     01/20/2022       Renal:    Lab Results   Component Value Date     01/20/2022    K 3.7 01/20/2022    K 3.4 01/14/2022    CL 97 01/20/2022    CO2 21 01/20/2022    BUN 11 01/20/2022    CREATININE 1.0 01/20/2022    CALCIUM 7.8 01/20/2022    PHOS 4.1 01/20/2022         Significant Diagnostic Studies    Radiology:   XR CHEST PORTABLE   Final Result   Multifocal bilateral pneumonia. US DUP ABD PEL RETRO SCROT COMPLETE   Final Result   Liver cirrhosis. The main portal vein is patent. The hepatic veins are patent. US LIVER   Final Result   Liver cirrhosis. The main portal vein is patent. The hepatic veins are patent. IR US GUIDED PARACENTESIS   Final Result   Successful paracentesis.          VL Extremity Venous Bilateral   Final Result      CT ABDOMEN PELVIS W IV CONTRAST Additional Contrast? Oral   Final Result   Volume overload including large volume ascites and counseling and review of medications and discharge plan. Note that greater  than 30 minutes was spent in preparing discharge papers, discussing discharge with patient, medication review, etc.     Signed:    Jeremiah Gillis MD   1/29/2022      Thank you Eliseo Villareal MD for the opportunity to be involved in this patient's care. If you have any questions or concerns please feel free to contact me at [phone].

## (undated) DEVICE — BW-412T DISP COMBO CLEANING BRUSH: Brand: SINGLE USE COMBINATION CLEANING BRUSH

## (undated) DEVICE — GLOVE ORTHO 8   MSG9480

## (undated) DEVICE — FORCEPS BX L240CM WRK CHN 2.8MM STD CAP W/ NDL MIC MESH

## (undated) DEVICE — SET VLV 3 PC AWS DISPOSABLE GRDIAN SCOPEVALET

## (undated) DEVICE — MOUTHPIECE ENDOSCP L CTRL OPN AND SIDE PORTS DISP

## (undated) DEVICE — SOLUTION IV IRRIG WATER 500ML POUR BRL ST 2F7113

## (undated) DEVICE — PROCEDURE KIT ENDOSCP CUST